# Patient Record
Sex: FEMALE | Race: WHITE | NOT HISPANIC OR LATINO | Employment: UNEMPLOYED | URBAN - METROPOLITAN AREA
[De-identification: names, ages, dates, MRNs, and addresses within clinical notes are randomized per-mention and may not be internally consistent; named-entity substitution may affect disease eponyms.]

---

## 2023-04-21 LAB
CHORIOGONADOTROPIN (MIU/ML) IN SER/PLAS: <2 MIU/ML
ERYTHROCYTE DISTRIBUTION WIDTH (RATIO) BY AUTOMATED COUNT: 12.7 % (ref 11.5–14.5)
ERYTHROCYTE MEAN CORPUSCULAR HEMOGLOBIN CONCENTRATION (G/DL) BY AUTOMATED: 32.4 G/DL (ref 32–36)
ERYTHROCYTE MEAN CORPUSCULAR VOLUME (FL) BY AUTOMATED COUNT: 93 FL (ref 80–100)
ERYTHROCYTES (10*6/UL) IN BLOOD BY AUTOMATED COUNT: 4.2 X10E12/L (ref 4–5.2)
FERRITIN (UG/LL) IN SER/PLAS: 24 UG/L (ref 8–150)
HEMATOCRIT (%) IN BLOOD BY AUTOMATED COUNT: 39.2 % (ref 36–46)
HEMOGLOBIN (G/DL) IN BLOOD: 12.7 G/DL (ref 12–16)
LEUKOCYTES (10*3/UL) IN BLOOD BY AUTOMATED COUNT: 4.7 X10E9/L (ref 4.4–11.3)
PLATELETS (10*3/UL) IN BLOOD AUTOMATED COUNT: 165 X10E9/L (ref 150–450)
THYROTROPIN (MIU/L) IN SER/PLAS BY DETECTION LIMIT <= 0.05 MIU/L: 1.58 MIU/L (ref 0.44–3.98)

## 2023-05-02 DIAGNOSIS — G43.909 MIGRAINE, UNSPECIFIED, NOT INTRACTABLE, WITHOUT STATUS MIGRAINOSUS: ICD-10-CM

## 2023-05-03 RX ORDER — RIMEGEPANT SULFATE 75 MG/75MG
TABLET, ORALLY DISINTEGRATING ORAL
Qty: 8 TABLET | Refills: 3 | Status: SHIPPED | OUTPATIENT
Start: 2023-05-03 | End: 2023-08-16

## 2023-08-11 DIAGNOSIS — G43.909 MIGRAINE, UNSPECIFIED, NOT INTRACTABLE, WITHOUT STATUS MIGRAINOSUS: ICD-10-CM

## 2023-08-16 RX ORDER — RIMEGEPANT SULFATE 75 MG/75MG
TABLET, ORALLY DISINTEGRATING ORAL
Qty: 8 TABLET | Refills: 3 | Status: SHIPPED | OUTPATIENT
Start: 2023-08-16 | End: 2023-11-27

## 2023-10-10 ENCOUNTER — APPOINTMENT (OUTPATIENT)
Dept: PRIMARY CARE | Facility: CLINIC | Age: 38
End: 2023-10-10
Payer: COMMERCIAL

## 2023-10-12 ENCOUNTER — OFFICE VISIT (OUTPATIENT)
Dept: PRIMARY CARE | Facility: CLINIC | Age: 38
End: 2023-10-12
Payer: COMMERCIAL

## 2023-10-12 VITALS
HEIGHT: 62 IN | DIASTOLIC BLOOD PRESSURE: 70 MMHG | HEART RATE: 75 BPM | SYSTOLIC BLOOD PRESSURE: 108 MMHG | BODY MASS INDEX: 21.27 KG/M2 | WEIGHT: 115.6 LBS

## 2023-10-12 DIAGNOSIS — M65.332 TRIGGER MIDDLE FINGER OF LEFT HAND: Primary | ICD-10-CM

## 2023-10-12 DIAGNOSIS — Z23 INFLUENZA VACCINE NEEDED: ICD-10-CM

## 2023-10-12 PROCEDURE — 1036F TOBACCO NON-USER: CPT | Performed by: STUDENT IN AN ORGANIZED HEALTH CARE EDUCATION/TRAINING PROGRAM

## 2023-10-12 PROCEDURE — 90686 IIV4 VACC NO PRSV 0.5 ML IM: CPT | Performed by: STUDENT IN AN ORGANIZED HEALTH CARE EDUCATION/TRAINING PROGRAM

## 2023-10-12 PROCEDURE — 90471 IMMUNIZATION ADMIN: CPT | Performed by: STUDENT IN AN ORGANIZED HEALTH CARE EDUCATION/TRAINING PROGRAM

## 2023-10-12 PROCEDURE — 99213 OFFICE O/P EST LOW 20 MIN: CPT | Performed by: STUDENT IN AN ORGANIZED HEALTH CARE EDUCATION/TRAINING PROGRAM

## 2023-10-12 RX ORDER — MINOCYCLINE HYDROCHLORIDE 100 MG/1
100 CAPSULE ORAL
COMMUNITY

## 2023-10-12 RX ORDER — CYPROHEPTADINE HYDROCHLORIDE 4 MG/1
4 TABLET ORAL NIGHTLY
COMMUNITY
Start: 2023-09-30

## 2023-10-12 NOTE — PROGRESS NOTES
"Subjective   Patient ID: Cassie Roberts is a 38 y.o. female who presents for Elbow Pain and Trigger Finger.    HPI   Patient has been noticing that she is experiencing what she believes is a trigger finger of her left middle finger    States that it has been catching and sometimes she is unable to fully open her hand    Notes some minor pain as well as some radiation to the middle of her elbow    Denies any trauma to the area and now wishes to discuss this further and wondering other treatment options/recommendations    Review of Systems  All pertinent positive symptoms are included in the history of present illness.    All other systems have been reviewed and are negative and noncontributory to this patient's current ailments.    Objective   /70 (BP Location: Right arm, Patient Position: Sitting, BP Cuff Size: Adult)   Pulse 75   Ht 1.575 m (5' 2\")   Wt 52.4 kg (115 lb 9.6 oz)   BMI 21.14 kg/m²     Physical Exam  CONSTITUTIONAL - well nourished, well developed, looks like stated age, in no acute distress, not ill-appearing, and not tired appearing  SKIN - normal skin color and pigmentation, normal skin turgor without rash, lesions, or nodules visualized  HEAD - no trauma, normocephalic  EYES - normal external exam  CHEST -no distressed breathing, good effort  EXTREMITIES - no edema, no deformities, noted slight catching of the finger upon examination with a small note on the hand  NEUROLOGICAL - normal balance, normal motor, no ataxia  PSYCHIATRIC - alert, pleasant and cordial, age-appropriate    Assessment/Plan   1.  Trigger finger  I would recommend that we provide a cortisone injection here in the office  I will notify you when my resident provider who provides these will be present and able to do so  This could be as soon as tomorrow or even Monday or Tuesday of next week    If you fail the injection, we will discuss getting you to a hand surgeon    2.  Flu vaccine need  All questions were answered " and you were counseled on immunization(s) in detail and vaccination was provided

## 2023-10-17 ENCOUNTER — OFFICE VISIT (OUTPATIENT)
Dept: PRIMARY CARE | Facility: CLINIC | Age: 38
End: 2023-10-17
Payer: COMMERCIAL

## 2023-10-17 VITALS
SYSTOLIC BLOOD PRESSURE: 104 MMHG | HEART RATE: 66 BPM | HEIGHT: 62 IN | DIASTOLIC BLOOD PRESSURE: 60 MMHG | BODY MASS INDEX: 21.16 KG/M2 | WEIGHT: 115 LBS

## 2023-10-17 DIAGNOSIS — M65.332 TRIGGER MIDDLE FINGER OF LEFT HAND: Primary | ICD-10-CM

## 2023-10-17 PROCEDURE — 20550 NJX 1 TENDON SHEATH/LIGAMENT: CPT | Performed by: STUDENT IN AN ORGANIZED HEALTH CARE EDUCATION/TRAINING PROGRAM

## 2023-10-17 PROCEDURE — 1036F TOBACCO NON-USER: CPT | Performed by: STUDENT IN AN ORGANIZED HEALTH CARE EDUCATION/TRAINING PROGRAM

## 2023-10-17 RX ORDER — TRIAMCINOLONE ACETONIDE 40 MG/ML
10 INJECTION, SUSPENSION INTRA-ARTICULAR; INTRAMUSCULAR ONCE
Status: COMPLETED | OUTPATIENT
Start: 2023-10-17 | End: 2023-10-17

## 2023-10-17 RX ADMIN — TRIAMCINOLONE ACETONIDE 10 MG: 40 INJECTION, SUSPENSION INTRA-ARTICULAR; INTRAMUSCULAR at 12:42

## 2023-10-17 NOTE — PROGRESS NOTES
"Subjective   Patient ID: Cassie Roberts is a 38 y.o. female who presents for Trigger Finger Coristone Injection.  Today she is accompanied by alone.     HPI  Trigger Finger  Patient has been noticing that she is experiencing what she believes is a trigger finger of her left middle finger  States that it has been catching and sometimes she is unable to fully open her hand   Notes some minor pain as well as some radiation to the middle of her elbow   Denies any trauma to the area and now wishes to discuss this further and wondering other treatment options/recommendations    Current Outpatient Medications on File Prior to Visit   Medication Sig Dispense Refill    cyproheptadine (Periactin) 4 mg tablet Take 1 tablet (4 mg) by mouth once daily at bedtime.      minocycline 100 mg capsule Take 1 capsule (100 mg) by mouth 2 times a day with meals.      Nurtec ODT 75 mg tablet,disintegrating TAKE 1 TABLET EVERY OTHER DAY AS NEEDED MIGRAINE 8 tablet 3     No current facility-administered medications on file prior to visit.        Allergies   Allergen Reactions    Cefaclor Rash       Immunization History   Administered Date(s) Administered    Flu vaccine (IIV4), preservative free *Check age/dose* 10/12/2023    Pfizer Purple Cap SARS-CoV-2 04/10/2021, 05/01/2021, 01/20/2022         Review of Systems  All pertinent positive symptoms are included in the history of present illness.  All other systems have been reviewed and are negative and noncontributory to this patient's current ailments.     Objective   /60 (BP Location: Left arm, Patient Position: Sitting, BP Cuff Size: Adult)   Pulse 66   Ht 1.575 m (5' 2\")   Wt 52.2 kg (115 lb)   BMI 21.03 kg/m²   BSA: 1.51 meters squared  No visits with results within 1 Month(s) from this visit.   Latest known visit with results is:   Legacy Encounter on 05/01/2023   Component Date Value Ref Range Status    Pathology Report 05/01/2023    Final                    Value:Name XAVIER, " KAREN CRISTIANGabby                                                                                                   Accession #: [id number]            Pathologist:                   Emerson Soni M.D., PhD.  Date of Procedure:    5/1/2023  Date Received:          5/2/2023  Date Reported           5/9/2023  Submitting Physician:   EAGLE CUTLER MD  Location:                    [facility]   Copy To/Referring/Attending:  EAGLE CUTLER MD Other External #                                                                    FINAL DIAGNOSIS  A.  ENDOMETRIUM, BIOPSY:  -- SECRETORY PATTERN ENDOMETRIUM.                                                                                                                                                                                                                                                                                                                                                                                                                                                                                                               Electronically Signed Out By Emerson Soni M.D., PhD./EAO  By the signature on this report, the individual or group listed as making the  Final Interpretation/Diagnosis certifies that they have reviewed this case.  Diagnostic interpretation performed at [facility] [address] [facility] [address]           Clinical History:  Fixative (A): FORMALIN  Clinical Diagnosis History: DUB (dysfunctional uterine bleeding) - (N93.8)    Specimens Submitted As:  A: ENDOMETRIAL BIOPSY     Gross Description:  Received in formalin, labeled with the patient's name and hospital number, are  multiple fragments of mucus, blood, and soft tissue aggregating to 2.8 x 1.0 x  0.5 cm.  The specimen is submitted in toto in one cassette.   DMB      dmb/5/3/2023               [facility]  Department of  Pathology   [address] [place] [address]           Physical Exam  CONSTITUTIONAL - well nourished, well developed, looks like stated age, in no acute distress, not ill-appearing, and not tired appearing  SKIN - normal skin color and pigmentation, normal skin turgor without rash, lesions, or nodules visualized  HEAD - no trauma, normocephalic  EYES - pupils are equal and reactive to light  NECK - supple without rigidity  CHEST - clear to auscultation, no wheezing, no crackles and no rales, good effort  CARDIAC - regular rate and regular rhythm, no skipped beats, no murmur  ABDOMEN - no organomegaly, soft, nontender, nondistended  EXTREMITIES - no edema, no deformities  NEUROLOGICAL - normal gait, normal balance, normal motor, no ataxia  PSYCHIATRIC - alert, pleasant and cordial, age-appropriate  IMMUNOLOGIC - no cervical lymphadenopathy     Patient ID: Cassie Roberts is a 38 y.o. female.    Injection tendon or ligament: L long A1 for trigger finger on 10/17/2023 12:22 PM  Indications: pain  Details: 27 G needle, volar approach  Outcome: tolerated well, no immediate complications    I explained the risks and benefits of a steroid injection into the patient's trigger finger tendon . These risks include but are not limited to local irritation from the injection or the numbing skin spray, irritation of the joint as a result of a reaction to the medications injected with possible flareup of the joint, and possible atrophy of the local subcutaneous fat, possibly skin discoloration at the site of the injection, and a risk of bleeding or infection, and a small risk of tendon rupture. The patient was instructed that if they did get flareup of the finger joint, they should ice the joint 15 minutes at a time 3 times a day for 3 days. It was explained to the patient that if the pain gets too significant or if they have any significant pain with range of motion that they did not experience pre-injection, that they  should go to a local Emergency Room right away. The patient decided to proceed with the injection.  The tendon nodule was identified and marked. The area was sterilely prepped utilizing Betadine swabs. 10 mg of Kenalog and 0.25 mL lidocaine for total volume of 0.5 mL drawn up into a 27-gauge needle. The needle was inserted perpendicularly into the nodule and the solution was injected into the nodule and tendon sheath. Patient tolerated the procedure well. No blood loss.  Consent was given by the patient.           Assessment/Plan   1.  Trigger finger  Cortisone injection given in the office  If you fail the injection, we will discuss getting you to a hand surgeon

## 2023-10-31 ENCOUNTER — APPOINTMENT (OUTPATIENT)
Dept: PRIMARY CARE | Facility: CLINIC | Age: 38
End: 2023-10-31
Payer: COMMERCIAL

## 2023-11-17 DIAGNOSIS — G43.909 MIGRAINE, UNSPECIFIED, NOT INTRACTABLE, WITHOUT STATUS MIGRAINOSUS: ICD-10-CM

## 2023-11-27 RX ORDER — RIMEGEPANT SULFATE 75 MG/75MG
TABLET, ORALLY DISINTEGRATING ORAL
Qty: 8 TABLET | Refills: 0 | Status: SHIPPED | OUTPATIENT
Start: 2023-11-27 | End: 2023-12-15 | Stop reason: SDUPTHER

## 2023-12-15 ENCOUNTER — OFFICE VISIT (OUTPATIENT)
Dept: PRIMARY CARE | Facility: CLINIC | Age: 38
End: 2023-12-15
Payer: COMMERCIAL

## 2023-12-15 VITALS
HEART RATE: 94 BPM | DIASTOLIC BLOOD PRESSURE: 80 MMHG | WEIGHT: 114.2 LBS | SYSTOLIC BLOOD PRESSURE: 126 MMHG | BODY MASS INDEX: 20.89 KG/M2

## 2023-12-15 DIAGNOSIS — G90.A POTS (POSTURAL ORTHOSTATIC TACHYCARDIA SYNDROME): ICD-10-CM

## 2023-12-15 DIAGNOSIS — G43.909 MIGRAINE, UNSPECIFIED, NOT INTRACTABLE, WITHOUT STATUS MIGRAINOSUS: ICD-10-CM

## 2023-12-15 DIAGNOSIS — M65.332 TRIGGER MIDDLE FINGER OF LEFT HAND: ICD-10-CM

## 2023-12-15 DIAGNOSIS — G43.809 OTHER MIGRAINE WITHOUT STATUS MIGRAINOSUS, NOT INTRACTABLE: ICD-10-CM

## 2023-12-15 DIAGNOSIS — Z00.00 HEALTHCARE MAINTENANCE: Primary | ICD-10-CM

## 2023-12-15 PROCEDURE — 93000 ELECTROCARDIOGRAM COMPLETE: CPT | Performed by: STUDENT IN AN ORGANIZED HEALTH CARE EDUCATION/TRAINING PROGRAM

## 2023-12-15 PROCEDURE — 99213 OFFICE O/P EST LOW 20 MIN: CPT | Performed by: STUDENT IN AN ORGANIZED HEALTH CARE EDUCATION/TRAINING PROGRAM

## 2023-12-15 PROCEDURE — 99395 PREV VISIT EST AGE 18-39: CPT | Performed by: STUDENT IN AN ORGANIZED HEALTH CARE EDUCATION/TRAINING PROGRAM

## 2023-12-15 PROCEDURE — 1036F TOBACCO NON-USER: CPT | Performed by: STUDENT IN AN ORGANIZED HEALTH CARE EDUCATION/TRAINING PROGRAM

## 2023-12-15 ASSESSMENT — PATIENT HEALTH QUESTIONNAIRE - PHQ9
2. FEELING DOWN, DEPRESSED OR HOPELESS: NOT AT ALL
1. LITTLE INTEREST OR PLEASURE IN DOING THINGS: NOT AT ALL
SUM OF ALL RESPONSES TO PHQ9 QUESTIONS 1 AND 2: 0

## 2023-12-15 NOTE — PROGRESS NOTES
"Subjective   Patient ID: Harper Roberts \"Jose Carlos" is a 38 y.o. female who presents for Annual Exam.  Today she is accompanied by alone.     HPI  Health Maintenance  Overall patient is doing well.   Immunization: Tdap 2022  Influenza vaccine UTD  COVID-19 vaccine (Pfizer Moderna) UTD  Colon Cancer Screening: No family history  OB/GYN: Sees Dr. Bernal; Pap smear up to date.  Stated that she has been having some abnormal uterine bleeding  Biopsy was actually performed as there was thickened endometrial tissue  Now wishes to discuss possibly the option of having an IUD placed  Diet: Working on her diet  Exercise: Attempting to exercise more frequently  Tobacco: Denies use  EtOH: Rarely/socially     2. Trigger finger  Patient has been noticing that she is experiencing what she believes is a trigger finger of her left middle finger   Steroid injection by Dr. Enio Peralta 10/17  Doing much better without any issues/complaints    3. Migraines  Continues using Nurtec as indicated  Requesting refills to be sent to her pharmacy    4.  POTS  Continues to follow-up with neurology with her last appointment being in January 2023  Currently asymptomatic and feels well     Current Outpatient Medications on File Prior to Visit   Medication Sig Dispense Refill    cyproheptadine (Periactin) 4 mg tablet Take 1 tablet (4 mg) by mouth once daily at bedtime.      minocycline 100 mg capsule Take 1 capsule (100 mg) by mouth 2 times a day with meals.      [DISCONTINUED] Nurtec ODT 75 mg tablet,disintegrating TAKE 1 TABLET EVERY OTHER DAY AS NEEDED MIGRAINE 8 tablet 0     No current facility-administered medications on file prior to visit.        Allergies   Allergen Reactions    Cefaclor Rash       Immunization History   Administered Date(s) Administered    Flu vaccine (IIV4), preservative free *Check age/dose* 11/07/2022, 10/12/2023    Hepatitis B vaccine, adult (RECOMBIVAX, ENGERIX) 01/25/2013, 02/22/2013, 08/02/2013    Influenza, Unspecified " 10/01/2014    Pfizer Purple Cap SARS-CoV-2 04/10/2021, 05/01/2021, 01/20/2022    Tdap vaccine, age 7 year and older (BOOSTRIX) 06/12/2015, 07/11/2022       Review of Systems  All pertinent positive symptoms are included in the history of present illness.  All other systems have been reviewed and are negative and noncontributory to this patient's current ailments.     Objective   /80 (BP Location: Left arm, Patient Position: Sitting, BP Cuff Size: Adult)   Pulse 94   Wt 51.8 kg (114 lb 3.2 oz)   BMI 20.89 kg/m²   BSA: 1.51 meters squared  No visits with results within 1 Month(s) from this visit.   Latest known visit with results is:   Legacy Encounter on 05/01/2023   Component Date Value Ref Range Status    Pathology Report 05/01/2023    Final                    Value:Name KAREN PARK                                                                                                   Accession #: [id number]            Pathologist:                   Emerson Soni M.D., PhD.  Date of Procedure:    5/1/2023  Date Received:          5/2/2023  Date Reported           5/9/2023  Submitting Physician:   EAGLE CUTLER MD  Location:                    [facility]   Copy To/Referring/Attending:  EAGLE CUTLER MD Other External #                                                                    FINAL DIAGNOSIS  A.  ENDOMETRIUM, BIOPSY:  -- SECRETORY PATTERN ENDOMETRIUM.                                                                                                                                                                                                                                                                                                                                                                                                                                                                                                               Electronically Signed Out By Emerson Soni  M.D., PhD./EAO  By the signature on this report, the individual or group listed as making the  Final Interpretation/Diagnosis certifies that they have reviewed this case.  Diagnostic interpretation performed at [facility] [id number] [place] [address]. [place] [address]           Clinical History:  Fixative (A): FORMALIN  Clinical Diagnosis History: DUB (dysfunctional uterine bleeding) - (N93.8)    Specimens Submitted As:  A: ENDOMETRIAL BIOPSY     Gross Description:  Received in formalin, labeled with the patient's name and hospital number, are  multiple fragments of mucus, blood, and soft tissue aggregating to 2.8 x 1.0 x  0.5 cm.  The specimen is submitted in toto in one cassette.   DMB      dmb/5/3/2023               [facility]  Department of Pathology   [address] [place] [address]           Physical Exam  CONSTITUTIONAL - well nourished, well developed, looks like stated age, in no acute distress, not ill-appearing, and not tired appearing  SKIN - normal skin color and pigmentation, normal skin turgor without rash, lesions, or nodules visualized  HEAD - no trauma, normocephalic  EYES - pupils are equal and reactive to light, extraocular muscles are intact, and normal external exam  ENT - TM's intact, no injection, no signs of infection, uvula midline, normal tongue movement and throat normal, no exudate  NECK - supple without rigidity, no neck mass was observed, no thyromegaly or thyroid nodules  CHEST - clear to auscultation, no wheezing, no crackles and no rales, good effort  CARDIAC - regular rate and regular rhythm, no skipped beats, no murmur  ABDOMEN - no organomegaly, soft, nontender, nondistended, no guarding/rebound/rigidity  EXTREMITIES - no edema, no deformities  NEUROLOGICAL - alert, oriented and no focal signs  PSYCHIATRIC - alert, pleasant and cordial, age-appropriate  IMMUNOLOGIC - no cervical lymphadenopathy      Assessment/Plan   1. Health  maintenance  Complete history and physical examination was performed  EKG reveals sinus rhythm without any acute changes  Requisition for CBC, CMP, A1c, lipid, TSH, vitamin D has been provided to you  We will notify of test results once available and make treatment recommendations accordingly  I do recommend you follow-up with your OB/GYN due to your signs/symptoms of endometrial bleeding as well as all your other signs/symptoms  An IUD may be a fantastic option even if this can help with your migraines  Please make an appointment at your earliest major convenience    2. Trigger finger  Continue monitoring signs/symptoms  I understand that you are continue to have elbow pain that could even be due to a tennis elbow picture  Please purchase an over-the-counter brace to see if this will help  Monitor symptoms and if they worsen please notify the office and we will discuss this further    3. Migraines  Continue using Nurtec as currently prescribed  Refill sent to your pharmacy    4.  POTS  Please continue following up with neurology per their protocol  Continue monitoring signs/symptoms    Please follow-up on an as-needed basis and/or yearly for your physicals

## 2024-02-05 ENCOUNTER — TELEMEDICINE (OUTPATIENT)
Dept: PRIMARY CARE | Facility: CLINIC | Age: 39
End: 2024-02-05
Payer: COMMERCIAL

## 2024-02-05 DIAGNOSIS — R09.81 SINUS CONGESTION: ICD-10-CM

## 2024-02-05 DIAGNOSIS — R42 DIZZINESS: ICD-10-CM

## 2024-02-05 DIAGNOSIS — J06.9 BACTERIAL URI: Primary | ICD-10-CM

## 2024-02-05 DIAGNOSIS — B96.89 BACTERIAL URI: Primary | ICD-10-CM

## 2024-02-05 DIAGNOSIS — J02.9 SORE THROAT: ICD-10-CM

## 2024-02-05 PROCEDURE — 99214 OFFICE O/P EST MOD 30 MIN: CPT | Performed by: STUDENT IN AN ORGANIZED HEALTH CARE EDUCATION/TRAINING PROGRAM

## 2024-02-05 RX ORDER — AMOXICILLIN AND CLAVULANATE POTASSIUM 875; 125 MG/1; MG/1
875 TABLET, FILM COATED ORAL 2 TIMES DAILY
Qty: 20 TABLET | Refills: 0 | Status: SHIPPED | OUTPATIENT
Start: 2024-02-05 | End: 2024-02-15

## 2024-02-05 RX ORDER — FLUTICASONE PROPIONATE 50 MCG
1 SPRAY, SUSPENSION (ML) NASAL 2 TIMES DAILY
Qty: 16 G | Refills: 1 | Status: SHIPPED | OUTPATIENT
Start: 2024-02-05

## 2024-02-05 RX ORDER — AZELASTINE 1 MG/ML
1 SPRAY, METERED NASAL 2 TIMES DAILY
Qty: 30 ML | Refills: 2 | Status: SHIPPED | OUTPATIENT
Start: 2024-02-05

## 2024-02-05 RX ORDER — METHYLPREDNISOLONE 4 MG/1
TABLET ORAL
Qty: 21 TABLET | Refills: 0 | Status: SHIPPED | OUTPATIENT
Start: 2024-02-05

## 2024-02-05 NOTE — PROGRESS NOTES
"Subjective   Patient ID: Harper Roberts \"Jose Carlos" is a 38 y.o. female who presents virtually for an upper respiratory infection  Today she is accompanied by alone.     HPI  Patient has had a productive cough, sore throat, and dizziness for about one week now.  Her  tested positive for COVID-19 one week ago; patient has tested negative twice so far.  Has history of sinus infections and feels that her symptoms may be evolving into a sinus infection due to new increased pressure, postnasal drainage, headaches, facial pain, and nasal dryness with nosebleeds.  Mucous is clear and not thick.  Also reports that 3 weeks ago, she began to experience worsening dizziness and new tinnitus in bilateral ears; went to urgent care because she was concerned about possible infection.  She is concerned that steroids for current illness could make POTS symptoms worse.    Current Outpatient Medications on File Prior to Visit   Medication Sig Dispense Refill    cyproheptadine (Periactin) 4 mg tablet Take 1 tablet (4 mg) by mouth once daily at bedtime.      minocycline 100 mg capsule Take 1 capsule (100 mg) by mouth 2 times a day with meals.      rimegepant (Nurtec ODT) 75 mg tablet,disintegrating Take 1 tablet (75 mg) by mouth if needed (Migraines). 16 tablet 3     No current facility-administered medications on file prior to visit.        Allergies   Allergen Reactions    Cefaclor Rash       Immunization History   Administered Date(s) Administered    Flu vaccine (IIV4), preservative free *Check age/dose* 11/07/2022, 10/12/2023    Hepatitis B vaccine, adult (RECOMBIVAX, ENGERIX) 01/25/2013, 02/22/2013, 08/02/2013    Influenza, Unspecified 10/01/2014    Pfizer Purple Cap SARS-CoV-2 04/10/2021, 05/01/2021, 01/20/2022    Tdap vaccine, age 7 year and older (BOOSTRIX, ADACEL) 06/12/2015, 07/11/2022         Review of Systems  All pertinent positive symptoms are included in the history of present illness.  All other systems have been " reviewed and are negative and noncontributory to this patient's current ailments.     Objective   There were no vitals taken for this visit.  BSA: There is no height or weight on file to calculate BSA.  No visits with results within 1 Month(s) from this visit.   Latest known visit with results is:   Legacy Encounter on 05/01/2023   Component Date Value Ref Range Status    Pathology Report 05/01/2023    Final                    Value:Name KAREN PARK                                                                                                   Accession #: [id number]            Pathologist:                   Emerson Soni M.D., PhD.  Date of Procedure:    5/1/2023  Date Received:          5/2/2023  Date Reported           5/9/2023  Submitting Physician:   EAGLE CUTLER MD  Location:                    [facility]   Copy To/Referring/Attending:  EAGLE CUTLER MD Other External #                                                                    FINAL DIAGNOSIS  A.  ENDOMETRIUM, BIOPSY:  -- SECRETORY PATTERN ENDOMETRIUM.                                                                                                                                                                                                                                                                                                                                                                                                                                                                                                               Electronically Signed Out By Emerson Soni M.D., PhD./EAO  By the signature on this report, the individual or group listed as making the  Final Interpretation/Diagnosis certifies that they have reviewed this case.  Diagnostic interpretation performed at [facility] [address] [facility] [address]           Clinical History:  Fixative (A): FORMALIN  Clinical Diagnosis History:  DUB (dysfunctional uterine bleeding) - (N93.8)    Specimens Submitted As:  A: ENDOMETRIAL BIOPSY     Gross Description:  Received in formalin, labeled with the patient's name and hospital number, are  multiple fragments of mucus, blood, and soft tissue aggregating to 2.8 x 1.0 x  0.5 cm.  The specimen is submitted in toto in one cassette.   ROXANN taylor/5/3/2023               [facility]  Department of Pathology   [address] [place] [address]           Physical Exam  Exam somewhat limited by virtual appointment.   CONSTITUTIONAL - well nourished, well developed, looks like stated age, in no acute distress, not ill-appearing, and not tired appearing  SKIN - normal skin color and pigmentation, normal skin turgor without rash, lesions, or nodules visualized  HEAD - no trauma, normocephalic  EYES - normal external exam  CHEST - no distressed breathing, good effort  NEUROLOGICAL - alert and oriented x3  PSYCHIATRIC - alert, pleasant and cordial, age-appropriate    Assessment/Plan   Bacterial Sinusitis  We discussed the likelihood of you having COVID-19, given your 's recent diagnosis, and the possibility of developing bacterial sinusitis on top of COVID-19.  I have sent Augmentin to your preferred pharmacy.  We discussed that steroids could actually help with POTS, rather than worsen symptoms.  Lastly, I did send over nasal sprays to use as prescribed  I have sent a prescription for prednisone over to help with sinusitis, however please try to refrain from using this for a few days unless your symptoms are worsening.  Risks, benefits, and options of treatment(s) were discussed after reviewing all current medication(s) and drug allergy(ies)  I opted for the treatment that we discussed with instructions on the medication use for your underlying medical ailment(s)  I encouraged supportive care such as rest, fluids and Advil/Tylenol as warranted  Return to the clinic in  7-10 days or sooner if symptoms worsen or persist as we will then further evaluate  At any point you have worsening or acute signs/symptoms such as chest pain or any difficulty breathing you notify me immediately and/or go to the nearest emergency room to be acutely evaluated

## 2024-03-08 ENCOUNTER — LAB (OUTPATIENT)
Dept: LAB | Facility: LAB | Age: 39
End: 2024-03-08
Payer: COMMERCIAL

## 2024-03-08 DIAGNOSIS — Z00.00 HEALTHCARE MAINTENANCE: ICD-10-CM

## 2024-03-08 DIAGNOSIS — N92.6 IRREGULAR MENSTRUAL BLEEDING: ICD-10-CM

## 2024-03-08 LAB
ALBUMIN SERPL BCP-MCNC: 4.4 G/DL (ref 3.4–5)
ALP SERPL-CCNC: 35 U/L (ref 33–110)
ALT SERPL W P-5'-P-CCNC: 10 U/L (ref 7–45)
ANION GAP SERPL CALC-SCNC: 10 MMOL/L (ref 10–20)
AST SERPL W P-5'-P-CCNC: 14 U/L (ref 9–39)
BASOPHILS # BLD AUTO: 0.03 X10*3/UL (ref 0–0.1)
BASOPHILS NFR BLD AUTO: 0.8 %
BILIRUB SERPL-MCNC: 0.4 MG/DL (ref 0–1.2)
BUN SERPL-MCNC: 18 MG/DL (ref 6–23)
CALCIUM SERPL-MCNC: 8.8 MG/DL (ref 8.6–10.3)
CHLORIDE SERPL-SCNC: 105 MMOL/L (ref 98–107)
CHOLEST SERPL-MCNC: 151 MG/DL (ref 0–199)
CHOLESTEROL/HDL RATIO: 2
CO2 SERPL-SCNC: 27 MMOL/L (ref 21–32)
CREAT SERPL-MCNC: 0.78 MG/DL (ref 0.5–1.05)
EGFRCR SERPLBLD CKD-EPI 2021: >90 ML/MIN/1.73M*2
EOSINOPHIL # BLD AUTO: 0.09 X10*3/UL (ref 0–0.7)
EOSINOPHIL NFR BLD AUTO: 2.3 %
ERYTHROCYTE [DISTWIDTH] IN BLOOD BY AUTOMATED COUNT: 13.2 % (ref 11.5–14.5)
GLUCOSE SERPL-MCNC: 78 MG/DL (ref 74–99)
HCT VFR BLD AUTO: 40.8 % (ref 36–46)
HDLC SERPL-MCNC: 75.2 MG/DL
HGB BLD-MCNC: 12.9 G/DL (ref 12–16)
IMM GRANULOCYTES # BLD AUTO: 0 X10*3/UL (ref 0–0.7)
IMM GRANULOCYTES NFR BLD AUTO: 0 % (ref 0–0.9)
LDLC SERPL CALC-MCNC: 65 MG/DL
LYMPHOCYTES # BLD AUTO: 1.58 X10*3/UL (ref 1.2–4.8)
LYMPHOCYTES NFR BLD AUTO: 39.7 %
MCH RBC QN AUTO: 30.4 PG (ref 26–34)
MCHC RBC AUTO-ENTMCNC: 31.6 G/DL (ref 32–36)
MCV RBC AUTO: 96 FL (ref 80–100)
MONOCYTES # BLD AUTO: 0.4 X10*3/UL (ref 0.1–1)
MONOCYTES NFR BLD AUTO: 10.1 %
NEUTROPHILS # BLD AUTO: 1.88 X10*3/UL (ref 1.2–7.7)
NEUTROPHILS NFR BLD AUTO: 47.1 %
NON HDL CHOLESTEROL: 76 MG/DL (ref 0–149)
NRBC BLD-RTO: 0 /100 WBCS (ref 0–0)
PLATELET # BLD AUTO: 181 X10*3/UL (ref 150–450)
POTASSIUM SERPL-SCNC: 4 MMOL/L (ref 3.5–5.3)
PROT SERPL-MCNC: 6.8 G/DL (ref 6.4–8.2)
RBC # BLD AUTO: 4.24 X10*6/UL (ref 4–5.2)
SODIUM SERPL-SCNC: 138 MMOL/L (ref 136–145)
TRIGL SERPL-MCNC: 55 MG/DL (ref 0–149)
TSH SERPL-ACNC: 2.21 MIU/L (ref 0.44–3.98)
VLDL: 11 MG/DL (ref 0–40)
WBC # BLD AUTO: 4 X10*3/UL (ref 4.4–11.3)

## 2024-03-08 PROCEDURE — 80053 COMPREHEN METABOLIC PANEL: CPT

## 2024-03-08 PROCEDURE — 83036 HEMOGLOBIN GLYCOSYLATED A1C: CPT

## 2024-03-08 PROCEDURE — 84443 ASSAY THYROID STIM HORMONE: CPT

## 2024-03-08 PROCEDURE — 82728 ASSAY OF FERRITIN: CPT

## 2024-03-08 PROCEDURE — 36415 COLL VENOUS BLD VENIPUNCTURE: CPT

## 2024-03-08 PROCEDURE — 80061 LIPID PANEL: CPT

## 2024-03-08 PROCEDURE — 85025 COMPLETE CBC W/AUTO DIFF WBC: CPT

## 2024-03-09 LAB
EST. AVERAGE GLUCOSE BLD GHB EST-MCNC: 105 MG/DL
HBA1C MFR BLD: 5.3 %

## 2024-03-10 NOTE — RESULT ENCOUNTER NOTE
Complete blood cell count does show a slightly lower white blood cell count of 4.0 but this has been stable for quite some time and no anemia noted    Hemoglobin A1c well within normal limits at 5.3%    Cholesterol looks good at 151, HDL 75, LDL 65, triglycerides 55    Sugar, kidneys, liver, electrolytes are all within normal limits    Thyroid within normal limits

## 2024-03-11 ENCOUNTER — OFFICE VISIT (OUTPATIENT)
Dept: OBSTETRICS AND GYNECOLOGY | Facility: CLINIC | Age: 39
End: 2024-03-11
Payer: COMMERCIAL

## 2024-03-11 VITALS — BODY MASS INDEX: 21.58 KG/M2 | SYSTOLIC BLOOD PRESSURE: 110 MMHG | WEIGHT: 118 LBS | DIASTOLIC BLOOD PRESSURE: 72 MMHG

## 2024-03-11 DIAGNOSIS — N92.6 IRREGULAR MENSTRUAL BLEEDING: ICD-10-CM

## 2024-03-11 LAB — FERRITIN SERPL-MCNC: 21 NG/ML (ref 8–150)

## 2024-03-11 PROCEDURE — 99214 OFFICE O/P EST MOD 30 MIN: CPT | Performed by: OBSTETRICS & GYNECOLOGY

## 2024-03-11 PROCEDURE — 1036F TOBACCO NON-USER: CPT | Performed by: OBSTETRICS & GYNECOLOGY

## 2024-03-11 NOTE — PROGRESS NOTES
Chief Complaint   Patient presents with    Vaginal Bleeding     Irregular Menstrual Cycles    [id number]    C/O irregular menstrual bleeding.  More pain in left ovary during cycles, sometimes having clots larger than a quarter in size.   Oct 17- 11 days on 12 days off, - 8 days on 12 days off,  8 days on 18 days off, Dec 25-  7 days on 17 days off, - 8 days on 16 days off,  7 days on 15 days off, Mar 4- current; also notes light spotting in between cycles on occasion.  Discuss possible IUD.    Chaperone declined.     Since September, patient has had frequent prolonged bleeding.  Some months bleeds as often as she does not and other months bleeds for 7 to 8 days and cycles are 20 days apart.  Patient wants to know everything is okay and she would like to know what her options are to improve her bleeding pattern.  Of note, 1 year ago had an ultrasound as well as an endometrial biopsy showing secretory endometrium.  Recent TSH and CBC are normal.  We did discuss potential perimenopausal reason for bleeding, though only 38.    Discussed all treatment options for bleeding and patient is interested in a progestin IUD.  Will repeat ultrasound to make sure no endometrial polyp.    REVIEW OF SYSTEMS    Please see HPI for reported pertinent positives, which would supersede this ROS    Constitutional:  Denies fever, chills, wt gain or loss, fatigue    Genito-Urinary:  Denies genital lesion or sores, vaginal dryness, itching  or pain.  No abnormal vaginal discharge or unexplained vaginal bleeding.  No dysuria, urinary incontinence or frequency.  Denies pelvic pain, dysmenorrhea or dyspareunia.    Eyes:  Denies vision changes, dryness  ENT:  No hearing loss, sinus pain or congestion, nosebleeds  Cardiovascular:  No chest pain or palpitations  Respiratory:  No SOB, cough, wheezing  GI:  No Nausea, vomiting, diarrhea, constipation, abdominal pain  Musculoskeletal:  No new back pain. joint pain,  peripheral edema  Skin:  No rash or skin lesion  Neurologic:  No HA, numbness or dizziness  Psychiatric:  No new anxiety or depression  Endocrine:  No loss of hair or hirsutism  Hematologic/lymphatic:  No swollen lymph nodes.  No reported tendency for easy bruising or bleeding    Patient admits to no other systemic complaints      PHYSICAL EXAM      PSYCH:  Pt is alert, oriented and cooperative    SKIN: warm, dry, w/o lesion    HEAD AND FACE:  External inspection of eyes, ears, functional cranial nerves normal and intact    THYROID:  No thyromegaly    CARDIOVASCULAR:  Warm and well Perfused    PULMONARY:  No respiratory distress    ABDOMEN:  soft, nontender.  No mass or organomegaly.      PELVIC:    External genitalia, urethra, perianal region normal to inspection and palpation if indicated.  No inguinal LA    Vagina without lesions.    Cervix seen and visually normal      Bimanual exam:      No pelvic mass palpable    Uterus nontender, midline in pelvis    No adnexal masses or tenderness    Assessment/Plan    Diagnoses and all orders for this visit:  Irregular menstrual bleeding - hoping for progestin iud if usn nl  -     Ferritin; Future  -     US pelvis; Future

## 2024-03-12 DIAGNOSIS — R79.89 ABNORMAL COMPLETE BLOOD COUNT: ICD-10-CM

## 2024-03-14 ENCOUNTER — HOSPITAL ENCOUNTER (OUTPATIENT)
Dept: RADIOLOGY | Facility: CLINIC | Age: 39
Discharge: HOME | End: 2024-03-14
Payer: COMMERCIAL

## 2024-03-14 ENCOUNTER — LAB (OUTPATIENT)
Dept: LAB | Facility: LAB | Age: 39
End: 2024-03-14
Payer: COMMERCIAL

## 2024-03-14 DIAGNOSIS — R79.89 ABNORMAL COMPLETE BLOOD COUNT: ICD-10-CM

## 2024-03-14 DIAGNOSIS — N92.6 IRREGULAR MENSTRUAL BLEEDING: ICD-10-CM

## 2024-03-14 LAB
BASOPHILS # BLD AUTO: 0.04 X10*3/UL (ref 0–0.1)
BASOPHILS NFR BLD AUTO: 1 %
EOSINOPHIL # BLD AUTO: 0.04 X10*3/UL (ref 0–0.7)
EOSINOPHIL NFR BLD AUTO: 1 %
ERYTHROCYTE [DISTWIDTH] IN BLOOD BY AUTOMATED COUNT: 12.8 % (ref 11.5–14.5)
HCT VFR BLD AUTO: 40.4 % (ref 36–46)
HGB BLD-MCNC: 13 G/DL (ref 12–16)
IMM GRANULOCYTES # BLD AUTO: 0.01 X10*3/UL (ref 0–0.7)
IMM GRANULOCYTES NFR BLD AUTO: 0.2 % (ref 0–0.9)
LYMPHOCYTES # BLD AUTO: 1.46 X10*3/UL (ref 1.2–4.8)
LYMPHOCYTES NFR BLD AUTO: 35.8 %
MCH RBC QN AUTO: 30.7 PG (ref 26–34)
MCHC RBC AUTO-ENTMCNC: 32.2 G/DL (ref 32–36)
MCV RBC AUTO: 95 FL (ref 80–100)
MONOCYTES # BLD AUTO: 0.45 X10*3/UL (ref 0.1–1)
MONOCYTES NFR BLD AUTO: 11 %
NEUTROPHILS # BLD AUTO: 2.08 X10*3/UL (ref 1.2–7.7)
NEUTROPHILS NFR BLD AUTO: 51 %
NRBC BLD-RTO: 0 /100 WBCS (ref 0–0)
PLATELET # BLD AUTO: 187 X10*3/UL (ref 150–450)
RBC # BLD AUTO: 4.24 X10*6/UL (ref 4–5.2)
WBC # BLD AUTO: 4.1 X10*3/UL (ref 4.4–11.3)

## 2024-03-14 PROCEDURE — 85025 COMPLETE CBC W/AUTO DIFF WBC: CPT

## 2024-03-14 PROCEDURE — 76830 TRANSVAGINAL US NON-OB: CPT | Performed by: OBSTETRICS & GYNECOLOGY

## 2024-03-14 PROCEDURE — 36415 COLL VENOUS BLD VENIPUNCTURE: CPT

## 2024-03-15 NOTE — RESULT ENCOUNTER NOTE
Complete blood cell count continues to show a slightly low white blood cell count but did improve up to 4.1 with previous at 4.0    Otherwise differential completely normal so I am not concerned    Will continue monitoring every 6-12 months

## 2024-04-01 DIAGNOSIS — D72.9 ABNORMAL WHITE BLOOD CELL COUNT: ICD-10-CM

## 2024-04-03 PROBLEM — N93.9 ABNORMAL UTERINE BLEEDING: Status: RESOLVED | Noted: 2024-04-03 | Resolved: 2024-04-03

## 2024-04-03 PROBLEM — I10 HYPERTENSION: Status: ACTIVE | Noted: 2024-04-03

## 2024-04-03 PROBLEM — D72.819 LEUKOPENIA: Status: ACTIVE | Noted: 2024-04-03

## 2024-04-03 PROBLEM — B37.31 CANDIDIASIS OF VAGINA: Status: ACTIVE | Noted: 2024-04-03

## 2024-04-03 PROBLEM — O13.3 GESTATIONAL HYPERTENSION, THIRD TRIMESTER (HHS-HCC): Status: RESOLVED | Noted: 2024-04-03 | Resolved: 2024-04-03

## 2024-04-03 PROBLEM — A69.20 LYME DISEASE: Status: ACTIVE | Noted: 2024-04-03

## 2024-04-03 PROBLEM — M79.673 PAIN OF FOOT: Status: RESOLVED | Noted: 2024-04-03 | Resolved: 2024-04-03

## 2024-04-03 PROBLEM — N64.4 BREAST PAIN: Status: RESOLVED | Noted: 2024-04-03 | Resolved: 2024-04-03

## 2024-04-03 PROBLEM — H04.123 DRY EYES, BILATERAL: Chronic | Status: RESOLVED | Noted: 2017-07-18 | Resolved: 2024-04-03

## 2024-04-03 PROBLEM — N99.820 POSTOPERATIVE VAGINAL BLEEDING FOLLOWING GENITOURINARY PROCEDURE: Status: RESOLVED | Noted: 2024-04-03 | Resolved: 2024-04-03

## 2024-04-03 PROBLEM — M65.332 ACQUIRED TRIGGER FINGER OF LEFT MIDDLE FINGER: Status: RESOLVED | Noted: 2024-04-03 | Resolved: 2024-04-03

## 2024-04-03 PROBLEM — Z20.822 CONTACT WITH AND (SUSPECTED) EXPOSURE TO COVID-19: Status: RESOLVED | Noted: 2022-08-29 | Resolved: 2024-04-03

## 2024-04-03 PROBLEM — D62 ACUTE POSTHEMORRHAGIC ANEMIA: Status: RESOLVED | Noted: 2022-08-29 | Resolved: 2024-04-03

## 2024-04-03 PROBLEM — D24.9 FIBROADENOMA OF BREAST: Status: ACTIVE | Noted: 2024-04-03

## 2024-04-03 PROBLEM — R42 DIZZINESS: Status: RESOLVED | Noted: 2024-04-03 | Resolved: 2024-04-03

## 2024-04-03 PROBLEM — R09.81 CONGESTION OF PARANASAL SINUS: Status: RESOLVED | Noted: 2024-04-03 | Resolved: 2024-04-03

## 2024-04-03 PROBLEM — Z3A.38 38 WEEKS GESTATION OF PREGNANCY (HHS-HCC): Status: RESOLVED | Noted: 2024-04-03 | Resolved: 2024-04-03

## 2024-04-03 PROBLEM — K58.9 IRRITABLE BOWEL SYNDROME WITHOUT DIARRHEA: Status: RESOLVED | Noted: 2022-09-01 | Resolved: 2024-04-03

## 2024-04-12 ENCOUNTER — PROCEDURE VISIT (OUTPATIENT)
Dept: OBSTETRICS AND GYNECOLOGY | Facility: CLINIC | Age: 39
End: 2024-04-12
Payer: COMMERCIAL

## 2024-04-12 VITALS — DIASTOLIC BLOOD PRESSURE: 80 MMHG | WEIGHT: 116 LBS | BODY MASS INDEX: 21.22 KG/M2 | SYSTOLIC BLOOD PRESSURE: 116 MMHG

## 2024-04-12 DIAGNOSIS — Z30.430 ENCOUNTER FOR IUD INSERTION: Primary | ICD-10-CM

## 2024-04-12 LAB — PREGNANCY TEST URINE, POC: NEGATIVE

## 2024-04-12 PROCEDURE — 81025 URINE PREGNANCY TEST: CPT | Performed by: OBSTETRICS & GYNECOLOGY

## 2024-04-12 PROCEDURE — 87800 DETECT AGNT MULT DNA DIREC: CPT

## 2024-04-12 PROCEDURE — 58300 INSERT INTRAUTERINE DEVICE: CPT | Performed by: OBSTETRICS & GYNECOLOGY

## 2024-04-12 RX ORDER — LEVONORGESTREL 52 MG/1
INTRAUTERINE DEVICE INTRAUTERINE ONCE
COMMUNITY

## 2024-04-12 NOTE — PROGRESS NOTES
"Chief Complaint   Patient presents with    IUD Insertion     Liletta IUD Insertion    [id number]    Liletta IUD   Lot# [id number]  Exp: 3/2026  NDC: [id number]    Would like to confirm that the progesterone is isolated in the Liletta, as she has not done well with Progesterone in the past.    Chaperone declined.     Patient ID: Harper Roberts \"Jose Carlos" is a 38 y.o. female.    IUD Insertion    Date/Time: 2024 10:49 AM    Performed by: Mark Bernal MD  Authorized by: Mark Bernal MD    Consent:     Consent obtained:  Verbal    Consent given by:  Patient    Procedure risks and benefits discussed: yes      Patient questions answered: yes      Patient agrees, verbalizes understanding, and wants to proceed: yes    Procedure:     Negative GC/chlamydia test: obtained.      Negative urine pregnancy test: yes      Cervix cleaned and prepped: yes      Speculum placed in vagina: yes      Tenaculum applied to cervix: yes      Uterus sound depth (cm):  8    IUD inserted with no complications: yes      IUD type: liletta.  Post-procedure:     Patient tolerated procedure well: yes      "

## 2024-04-13 LAB
C TRACH RRNA SPEC QL NAA+PROBE: NEGATIVE
N GONORRHOEA DNA SPEC QL PROBE+SIG AMP: NEGATIVE

## 2024-04-21 DIAGNOSIS — G43.909 MIGRAINE, UNSPECIFIED, NOT INTRACTABLE, WITHOUT STATUS MIGRAINOSUS: ICD-10-CM

## 2024-04-22 RX ORDER — RIMEGEPANT SULFATE 75 MG/75MG
TABLET, ORALLY DISINTEGRATING ORAL
Qty: 16 TABLET | Refills: 3 | Status: SHIPPED | OUTPATIENT
Start: 2024-04-22

## 2024-05-10 ENCOUNTER — APPOINTMENT (OUTPATIENT)
Dept: OBSTETRICS AND GYNECOLOGY | Facility: CLINIC | Age: 39
End: 2024-05-10
Payer: COMMERCIAL

## 2024-05-24 ENCOUNTER — OFFICE VISIT (OUTPATIENT)
Dept: OBSTETRICS AND GYNECOLOGY | Facility: CLINIC | Age: 39
End: 2024-05-24
Payer: COMMERCIAL

## 2024-05-24 VITALS — BODY MASS INDEX: 21.22 KG/M2 | DIASTOLIC BLOOD PRESSURE: 60 MMHG | WEIGHT: 116 LBS | SYSTOLIC BLOOD PRESSURE: 98 MMHG

## 2024-05-24 DIAGNOSIS — Z30.431 ENCOUNTER FOR ROUTINE CHECKING OF INTRAUTERINE CONTRACEPTIVE DEVICE (IUD): ICD-10-CM

## 2024-05-24 PROCEDURE — 99213 OFFICE O/P EST LOW 20 MIN: CPT | Performed by: OBSTETRICS & GYNECOLOGY

## 2024-05-24 PROCEDURE — 3078F DIAST BP <80 MM HG: CPT | Performed by: OBSTETRICS & GYNECOLOGY

## 2024-05-24 PROCEDURE — 3074F SYST BP LT 130 MM HG: CPT | Performed by: OBSTETRICS & GYNECOLOGY

## 2024-05-24 RX ORDER — ATOVAQUONE 750 MG/5ML
SUSPENSION ORAL
COMMUNITY
Start: 2024-04-29

## 2024-05-24 NOTE — PROGRESS NOTES
"Chief Complaint   Patient presents with    Follow-up     Liletta IUD Follow Up    [id number]    Liletta IUD placed 24    C/O bleeding or spotting since insertion of the Liletta, some cramping, \"scratchy/pinching\" feeling during intercourse.    Chaperone declined.     Note start brownish spotting nearly daily.  Has felt the IUD string a bit \"scratchy\" with 2 episodes of intercourse.    REVIEW OF SYSTEMS    Please see HPI for reported pertinent positives, which would supersede this ROS    Constitutional:  Denies fever, chills, wt gain or loss, fatigue    Genito-Urinary:  Denies genital lesion or sores, vaginal dryness, itching  or pain.  No abnormal vaginal discharge or unexplained vaginal bleeding.  No dysuria, urinary incontinence or frequency.  Denies pelvic pain, dysmenorrhea or dyspareunia.    Eyes:  Denies vision changes, dryness  ENT:  No hearing loss, sinus pain or congestion, nosebleeds  Cardiovascular:  No chest pain or palpitations  Respiratory:  No SOB, cough, wheezing  GI:  No Nausea, vomiting, diarrhea, constipation, abdominal pain  Musculoskeletal:  No new back pain. joint pain, peripheral edema  Skin:  No rash or skin lesion  Neurologic:  No HA, numbness or dizziness  Psychiatric:  No new anxiety or depression  Endocrine:  No loss of hair or hirsutism  Hematologic/lymphatic:  No swollen lymph nodes.  No reported tendency for easy bruising or bleeding    Patient admits to no other systemic complaints      PHYSICAL EXAM      PSYCH:  Pt is alert, oriented and cooperative    SKIN: warm, dry, w/o lesion    HEAD AND FACE:  External inspection of eyes, ears, functional cranial nerves normal and intact    THYROID:  No thyromegaly    CARDIOVASCULAR:  Warm and well Perfused    PULMONARY:  No respiratory distress    ABDOMEN:  soft, nontender.  No mass or organomegaly.      PELVIC:    External genitalia, urethra, perianal region normal to inspection and palpation if indicated.  No inguinal LA    Vagina " without lesions.    Cervix seen and visually normal  String seen.  2 to 3 cm long.  Trimmed 1 cm.    Assessment/Plan    Diagnoses and all orders for this visit:  Encounter for routine checking of intrauterine contraceptive device (IUD)

## 2024-06-13 ENCOUNTER — OFFICE VISIT (OUTPATIENT)
Dept: HEMATOLOGY/ONCOLOGY | Facility: CLINIC | Age: 39
End: 2024-06-13
Payer: COMMERCIAL

## 2024-06-13 VITALS
SYSTOLIC BLOOD PRESSURE: 135 MMHG | BODY MASS INDEX: 21.49 KG/M2 | WEIGHT: 117.5 LBS | RESPIRATION RATE: 16 BRPM | DIASTOLIC BLOOD PRESSURE: 87 MMHG | TEMPERATURE: 97.9 F | HEART RATE: 65 BPM | OXYGEN SATURATION: 100 %

## 2024-06-13 DIAGNOSIS — R10.9 ABDOMINAL PAIN, UNSPECIFIED ABDOMINAL LOCATION: ICD-10-CM

## 2024-06-13 DIAGNOSIS — D70.9 NEUTROPENIA, UNSPECIFIED TYPE (CMS-HCC): Primary | ICD-10-CM

## 2024-06-13 DIAGNOSIS — Z80.3 FAMILY HISTORY OF BREAST CANCER: ICD-10-CM

## 2024-06-13 DIAGNOSIS — D72.9 ABNORMAL WHITE BLOOD CELL COUNT: ICD-10-CM

## 2024-06-13 DIAGNOSIS — A69.20 LYME DISEASE: ICD-10-CM

## 2024-06-13 DIAGNOSIS — M35.00 SJOGREN'S SYNDROME, WITH UNSPECIFIED ORGAN INVOLVEMENT (MULTI): ICD-10-CM

## 2024-06-13 DIAGNOSIS — R10.9 LEFT SIDED ABDOMINAL PAIN: ICD-10-CM

## 2024-06-13 LAB
ALBUMIN SERPL BCP-MCNC: 4.7 G/DL (ref 3.4–5)
ALP SERPL-CCNC: 40 U/L (ref 33–110)
ALT SERPL W P-5'-P-CCNC: 11 U/L (ref 7–45)
ANION GAP SERPL CALC-SCNC: 13 MMOL/L (ref 10–20)
APTT PPP: 32 SECONDS (ref 27–38)
AST SERPL W P-5'-P-CCNC: 16 U/L (ref 9–39)
BASOPHILS # BLD AUTO: 0.04 X10*3/UL (ref 0–0.1)
BASOPHILS NFR BLD AUTO: 1.2 %
BILIRUB SERPL-MCNC: 0.6 MG/DL (ref 0–1.2)
BUN SERPL-MCNC: 12 MG/DL (ref 6–23)
CALCIUM SERPL-MCNC: 9.3 MG/DL (ref 8.6–10.3)
CENTROMERE B AB SER-ACNC: <0.2 AI
CHLORIDE SERPL-SCNC: 103 MMOL/L (ref 98–107)
CHROMATIN AB SERPL-ACNC: <0.2 AI
CO2 SERPL-SCNC: 26 MMOL/L (ref 21–32)
CREAT SERPL-MCNC: 0.76 MG/DL (ref 0.5–1.05)
CRP SERPL-MCNC: <0.1 MG/DL
DSDNA AB SER-ACNC: <1 IU/ML
EBV EA IGG SER QL: POSITIVE
EBV NA AB SER QL: POSITIVE
EBV VCA IGG SER IA-ACNC: POSITIVE
EBV VCA IGM SER IA-ACNC: POSITIVE
EGFRCR SERPLBLD CKD-EPI 2021: >90 ML/MIN/1.73M*2
ENA JO1 AB SER QL IA: <0.2 AI
ENA RNP AB SER IA-ACNC: <0.2 AI
ENA SCL70 AB SER QL IA: <0.2 AI
ENA SM AB SER IA-ACNC: <0.2 AI
ENA SM+RNP AB SER QL IA: <0.2 AI
ENA SS-A AB SER IA-ACNC: <0.2 AI
ENA SS-B AB SER IA-ACNC: <0.2 AI
EOSINOPHIL # BLD AUTO: 0.06 X10*3/UL (ref 0–0.7)
EOSINOPHIL NFR BLD AUTO: 1.8 %
ERYTHROCYTE [DISTWIDTH] IN BLOOD BY AUTOMATED COUNT: 12.3 % (ref 11.5–14.5)
ERYTHROCYTE [SEDIMENTATION RATE] IN BLOOD BY WESTERGREN METHOD: 17 MM/H (ref 0–20)
FERRITIN SERPL-MCNC: 23 NG/ML (ref 8–150)
GLUCOSE SERPL-MCNC: 94 MG/DL (ref 74–99)
HBV CORE AB SER QL: NONREACTIVE
HBV CORE IGM SER QL: NONREACTIVE
HCT VFR BLD AUTO: 40.6 % (ref 36–46)
HCV AB SER QL: NONREACTIVE
HGB BLD-MCNC: 13.3 G/DL (ref 12–16)
HGB RETIC QN: 35 PG (ref 28–38)
HIV 1+2 AB+HIV1 P24 AG SERPL QL IA: NONREACTIVE
HOLD SPECIMEN: NORMAL
IGA SERPL-MCNC: 197 MG/DL (ref 70–400)
IGG SERPL-MCNC: 976 MG/DL (ref 700–1600)
IGM SERPL-MCNC: 98 MG/DL (ref 40–230)
IMM GRANULOCYTES # BLD AUTO: 0.01 X10*3/UL (ref 0–0.7)
IMM GRANULOCYTES NFR BLD AUTO: 0.3 % (ref 0–0.9)
IMMATURE RETIC FRACTION: 3.8 %
INR PPP: 1 (ref 0.9–1.1)
IRON SATN MFR SERPL: 25 % (ref 25–45)
IRON SERPL-MCNC: 91 UG/DL (ref 35–150)
LYMPHOCYTES # BLD AUTO: 1.36 X10*3/UL (ref 1.2–4.8)
LYMPHOCYTES NFR BLD AUTO: 40.6 %
MCH RBC QN AUTO: 30.7 PG (ref 26–34)
MCHC RBC AUTO-ENTMCNC: 32.8 G/DL (ref 32–36)
MCV RBC AUTO: 94 FL (ref 80–100)
MONOCYTES # BLD AUTO: 0.29 X10*3/UL (ref 0.1–1)
MONOCYTES NFR BLD AUTO: 8.7 %
NEUTROPHILS # BLD AUTO: 1.59 X10*3/UL (ref 1.2–7.7)
NEUTROPHILS NFR BLD AUTO: 47.4 %
NRBC BLD-RTO: ABNORMAL /100{WBCS}
PLATELET # BLD AUTO: 159 X10*3/UL (ref 150–450)
POTASSIUM SERPL-SCNC: 3.8 MMOL/L (ref 3.5–5.3)
PROT SERPL-MCNC: 7.2 G/DL (ref 6.4–8.2)
PROTHROMBIN TIME: 11.6 SECONDS (ref 9.8–12.8)
RBC # BLD AUTO: 4.33 X10*6/UL (ref 4–5.2)
RETICS #: 0.04 X10*6/UL (ref 0.02–0.08)
RETICS/RBC NFR AUTO: 1 % (ref 0.5–2)
RIBOSOMAL P AB SER-ACNC: <0.2 AI
SODIUM SERPL-SCNC: 138 MMOL/L (ref 136–145)
TIBC SERPL-MCNC: 357 UG/DL (ref 240–445)
UIBC SERPL-MCNC: 266 UG/DL (ref 110–370)
VIT B12 SERPL-MCNC: 449 PG/ML (ref 211–911)
WBC # BLD AUTO: 3.4 X10*3/UL (ref 4.4–11.3)

## 2024-06-13 PROCEDURE — 3079F DIAST BP 80-89 MM HG: CPT

## 2024-06-13 PROCEDURE — 82607 VITAMIN B-12: CPT | Mod: GEALAB

## 2024-06-13 PROCEDURE — 86235 NUCLEAR ANTIGEN ANTIBODY: CPT | Mod: GEALAB

## 2024-06-13 PROCEDURE — 82728 ASSAY OF FERRITIN: CPT

## 2024-06-13 PROCEDURE — 83550 IRON BINDING TEST: CPT

## 2024-06-13 PROCEDURE — 86140 C-REACTIVE PROTEIN: CPT

## 2024-06-13 PROCEDURE — 85652 RBC SED RATE AUTOMATED: CPT

## 2024-06-13 PROCEDURE — 86705 HEP B CORE ANTIBODY IGM: CPT | Mod: GEALAB

## 2024-06-13 PROCEDURE — 86664 EPSTEIN-BARR NUCLEAR ANTIGEN: CPT | Mod: GEALAB

## 2024-06-13 PROCEDURE — 85730 THROMBOPLASTIN TIME PARTIAL: CPT

## 2024-06-13 PROCEDURE — 87389 HIV-1 AG W/HIV-1&-2 AB AG IA: CPT | Mod: GEALAB

## 2024-06-13 PROCEDURE — 82525 ASSAY OF COPPER: CPT

## 2024-06-13 PROCEDURE — 36415 COLL VENOUS BLD VENIPUNCTURE: CPT

## 2024-06-13 PROCEDURE — 99215 OFFICE O/P EST HI 40 MIN: CPT

## 2024-06-13 PROCEDURE — 3075F SYST BP GE 130 - 139MM HG: CPT

## 2024-06-13 PROCEDURE — 85045 AUTOMATED RETICULOCYTE COUNT: CPT

## 2024-06-13 PROCEDURE — 86704 HEP B CORE ANTIBODY TOTAL: CPT | Mod: GEALAB

## 2024-06-13 PROCEDURE — 85025 COMPLETE CBC W/AUTO DIFF WBC: CPT

## 2024-06-13 PROCEDURE — 99205 OFFICE O/P NEW HI 60 MIN: CPT

## 2024-06-13 PROCEDURE — 86803 HEPATITIS C AB TEST: CPT | Mod: GEALAB

## 2024-06-13 PROCEDURE — 80053 COMPREHEN METABOLIC PANEL: CPT

## 2024-06-13 PROCEDURE — 82784 ASSAY IGA/IGD/IGG/IGM EACH: CPT | Mod: GEALAB

## 2024-06-13 ASSESSMENT — PAIN SCALES - GENERAL: PAINLEVEL: 2

## 2024-06-13 NOTE — PROGRESS NOTES
"[facility]    PATIENT VISIT INFORMATION    Visit Type: New Visit    Referring Provider: Gordo Leblanc DO  Reason for referral: Abnormal white count   Primary Practice Provider: Gordo Leblanc DO    HEMATOLOGY HISTORY    38-year-old female presents for evaluation of abnormal white cell count.  Referred by her primary care physician.    Leukopenia noted from 2 years ago, but she recall it transiently over the last 20 years, starting in college. Anemic during pregnancy.  Per medical record available the patient's WBCs ranged from 3.1-4.0.  No other notable abnormalities on CBC with differential.    Patient states diagnosis of Sjogren's disease by rheumatology in [place], potentially lupus, POTS, and in [place] 8 to 10 years ago Lyme disease.     HISTORY OF PRESENT ILLNESS     ID Statement: Harper Roberts \"Jose Carlos" is a 38-year-old female    Chief Complaint: Low white count    Interval History:   She presents for evaluation of low WBCs.    She reports Appetite good, no weight loss recently. Lost weight and low appetite right after birth of her daughter 2 years ago.    She notes a flare up a couple months. Chest pain for years, shooting pain across her rib cage over the last 10 years, SOB daily exertional and at rest occasionally, joint pain all over (feet, hands, neck, lower back, knees locking up), trigger finger and elbow pain, received cortisone injection Fall 2023. \"Migraines most of my life with aura and blurry vision.\" About 15 migraines a month. Sometimes ocular migraines. Nose bleeds on an off, worse lately, may go months in between or 6 in one day. Six in one day a few days ago. \"Started during my pregnancy and has continues like a faucet.\"    Notable rashes off and on, started 10 years ago. Pityriasis rosacea diagnosed.  Locations include chest, face, arm. No itch. Gets worse with a hot shower.  Always fatigue, tiered, loss of energy. Not able to nap due " [facility] at bedside for transfer. "to work and children.  Reports a flush feeling. Temp 99F at times. Hot and cold fluctuations. Cold hands and feet all the time. Floaters with eyes. Worse with flare. Followed optometrist regularly and ophthalmologist 6 years ago. Optic nerve drusen diagnosed at age 12 years old. IBS symptoms. EGD and colonoscopy 7 years ago blood in stool and in urine noted at that time. Dull pain in back left side and sharp pain under left rib with deep breathes over the last 10 years. Left ache on lower left abdomen. Swollen hands in morning, unable to wear wedding band. Better in afternoon.    Heavy periods 10 days on and 10 days off and spotting in between. Bleeding light every day since IUD placed two months ago. Biopsy performed. Lesions benign.     Tinnitus 6 months ago. Numbness and tingling hands and feet sometimes face for 10 years off and on. Left breast biopsy 10 years ago diagnosed as fibroadenoma.     Hx of drenching night sweats before and after birth of daughter. Not any in the last 6 months. Oral sores 5-10 location and description as \"lumps on inner cheek and roof of mouth swollen.\" Sore throats daily, seasonal allergie, no other symptoms when they occur.   Recent sinus infection resolved 2 weeks ago.     Diagnosed with Lyme's Disease. Minocycline a couple months at a time. Follows a specialist out of [place].     Denies any lymphadenopathy or consistent neuropathy.  Sometimes urgent loose stool (IBS), Nausea ongoing as baseline, low blood sugar which helps with nausea. No constipation, urinating ok. No blood in urine now.     History of US of kidney and inflammation noted.     PAST/CURRENT HISTORY     MEDICAL/SURGICAL HISTORY  -Leukopenia  -Molar miscarriage for 10-11 weeks   -Preeclampsia both pregnancies   -Emergency  with one pregnancy  -Second pregnancy placenta previa    -Sjogrens   -POTS  -Lupus?   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   -Asthma 10 years ago on inhalers   -Covid 2024 " mild  -possible kidney stone   -Hypoglycemia   -Migraine headaches with aura  -Anemia in pregnancy  -Candidiasis of vagina  -Hypertension  -Lyme's disease    SOCIAL HISTORY  -Lives with  and two kids (2 years and 9 years old)  -Work place:      -Tobacco/smokeless use: denies   -Alcohol: on occasion a couple drinks a month   -Illicit drug or marijuana use: denies   -Amish or Spiritual beliefs: Humanistic   -Social Determinates of Health Concerns: none reported     FAMILY HISTORY  -Mom living 68 years old Rosacea, breast cancer stage 2 HER+ diagnosis at 58 years, lumpectomy and radiation, kidney stones   -Maternal cousin brain tumor  at age 35 years old (many health issues)  -Maternal female cousin autoimmune disease unknown types  -Maternal Uncle end stages of colon stages 74 years old, hx autoimmune and EBV   -Maternal cousin female other autoimmune undiagnosed   -Paternal Uncle 50 years old diagnosed with stage 4 throat cancer non-smoker, living   -Paternal Uncle aortic aneurysm, covid prior  70 years old  -Paternal Uncle  MI (some life style risk factors)   -No other known history of hematologic, bleeding, clotting, autoimmune, genetic, or malignant disorders in the family.     OCCUPATIONAL/ENVIRONMENTAL HISTORY/EXPOSURES:  -None reported    Active Problems, Allergy List, Medication List, and PMH/PSH/FH/Social Hx have been reviewed and reconciled in chart. Updates made when necessary.     REVIEW OF SYSTEMS   A review of systems has been completed and are negative for complaints except what is stated in the assessment, HPI, IH, ROS, and/or past medical history.    ALLERGIES AND MEDICATIONS     Allergies and Intolerances:   Allergies   Allergen Reactions    Cefaclor Rash      Medication Profile:   Current Outpatient Medications   Medication Instructions    levonorgestreL (Liletta) 20.4 mcg/24 hrs (8 yrs) 52 mg intrauterine device intrauterine, Once     "Nurtec ODT 75 mg tablet,disintegrating TAKE 1 TABLET (75 MG) BY MOUTH IF NEEDED (MIGRAINES).      Available Vaccination Record:   Immunization History   Administered Date(s) Administered    Flu vaccine (IIV4), preservative free *Check age/dose* 11/07/2022, 10/12/2023    Hepatitis B vaccine, adult (RECOMBIVAX, ENGERIX) 01/25/2013, 02/22/2013, 08/02/2013    Influenza, Unspecified 10/01/2014    Pfizer Purple Cap SARS-CoV-2 04/10/2021, 05/01/2021, 01/20/2022    Tdap vaccine, age 7 year and older (BOOSTRIX, ADACEL) 06/12/2015, 07/11/2022      PHYSICAL EXAM     Vital Signs/Measurements:       10/12/2023    11:01 AM 10/17/2023    11:53 AM 12/15/2023    10:27 AM 3/11/2024     1:43 PM 4/12/2024    10:22 AM 5/24/2024    11:51 AM 6/13/2024     9:08 AM   Vitals   Systolic 108 104 126 110 116 98 135   Diastolic 70 60 80 72 80 60 87   Heart Rate 75 66 94    65   Temp       36.6 °C (97.9 °F)   Resp       16   Height (in) 1.575 m (5' 2\") 1.575 m (5' 2\")        Weight (lb) 115.6 115 114.2 118 116 116 117.51   BMI 21.14 kg/m2 21.03 kg/m2 20.89 kg/m2 21.58 kg/m2 21.22 kg/m2 21.22 kg/m2 21.49 kg/m2   BSA (m2) 1.51 m2 1.51 m2 1.51 m2 1.53 m2 1.52 m2 1.52 m2 1.53 m2   Visit Report Report Report Report Report  Report Report        Performance:   ECOG Performance Status: 0     Grade ECOG performance status   0 Fully active, able to carry on all pre-disease performance without restriction   1 Restricted in physically strenuous activity but ambulatory and able to carry out work of a light or sedentary nature, e.g., light housework, office work   2 Ambulatory and capable of all selfcare but unable to carry out any work activities; Up and about more than 50% of waking hours   3 Capable of only limited selfcare, confined to bed or chair more than 50% of waking hours   4 Completely disabled; Cannot carry out any selfcare; Totally confined to bed or chair   5 Dead     Physical Exam:  General: Patient is awake/alert/oriented x3, no distress, " Nourished, hydrated, alert and cooperative, ambulating without difficulty  Skin: Expected color for ethnicity, good turgor, dry, no prominent lesions, blotchy redness across chest diminished during visit, no unusual bruising, or bleeding   Hair: Normal texture and distribution   Nails: Normal color, no deformities    HEENT:   Head: Normocephalic, atraumatic, no visible or masses, depressions, or scarring   Eyes: Conjunctiva clear, sclera non-icteric, no exudates or hemorrhages   Ears: nl appearance, hearing intact    Nose: no external lesions, mucosa non-inflamed, no rhinorrhea   Mouth: Mucous membranes moist, no lesions, sores, bleeding, or erythema     Head/Neck: Neck supple, no apparent injury, thyroid without mass or tenderness, No JVD, trachea midline, no bruits appreciated    Respiratory/Thorax: Patent airways, CTAB, chest symmetry, normal inspiratory and expiratory effort    Cardiovascular: Regular rate and rhythm, no murmur or gallop, no carotid bruit or thrills    Gastrointestinal: Bowel sounds normal, non-distended, soft, mild tenderness L side, no masses or hernia, or organomegaly appreciated    Genitourinary: deferred   Musculoskeletal: Normal gait, normal range of motion, no pain on palpation of spine, no deformity, normal strength for baseline, no atrophy, and no CVA tenderness appreciated   Extremities: No amputations or deformities, cyanosis, edema or viscosities, peripheral pulses intact   Neurological: Sensation present to touch, intact senses, motor response and reflexes normal, normal strength   Breast:    Lymphatic: No significant lymphadenopathy   Psychological: Intact recent and remote memory, judgement, and insight. Appropriate mood, affect, and behavior          RESULTS/DATA     Labs:   Lab Results   Component Value Date    WBC 3.4 (L) 06/13/2024    NEUTROABS 1.59 06/13/2024    IGABSOL 0.01 06/13/2024    LYMPHSABS 1.36 06/13/2024    MONOSABS 0.29 06/13/2024    EOSABS 0.06 06/13/2024     "BASOSABS 0.04 06/13/2024    RBC 4.33 06/13/2024    MCV 94 06/13/2024    MCHC 32.8 06/13/2024    HGB 13.3 06/13/2024    HCT 40.6 06/13/2024     06/13/2024     Lab Results   Component Value Date    RETICCTPCT 1.0 06/13/2024      Lab Results   Component Value Date    CREATININE 0.76 06/13/2024    BUN 12 06/13/2024    EGFR >90 06/13/2024     06/13/2024    K 3.8 06/13/2024     06/13/2024    CO2 26 06/13/2024      Lab Results   Component Value Date    ALT 11 06/13/2024    AST 16 06/13/2024    ALKPHOS 40 06/13/2024    BILITOT 0.6 06/13/2024      Lab Results   Component Value Date    TSH 2.21 03/08/2024     Lab Results   Component Value Date    TSH 2.21 03/08/2024     Lab Results   Component Value Date    IRON 91 06/13/2024    TIBC 357 06/13/2024    FERRITIN 23 06/13/2024      Lab Results   Component Value Date    IGJJUMZB82 449 06/13/2024        Lab Results   Component Value Date    SEDRATE 17 06/13/2024      Lab Results   Component Value Date    CRP <0.10 06/13/2024      No results found for: \"COURTNEY\"  Lab Results   Component Value Date     08/26/2022        Radiology/Studies:   US abdomen ordered     ASSESSMENT/PLAN     Assessment and Plan:   #1. Leukopenia   38-year-old female presents for evaluation of abnormal white cell count.  Referred by her primary care physician.    Leukopenia noted from 2 years ago, but she recall it transiently over the last 20 years, starting in college. Anemic during pregnancy.  Per medical record available the patient's WBCs ranged from 3.1-4.0.  No other notable abnormalities on CBC with differential.    Patient states diagnosis of Sjogren's disease by rheumatology in [place], potentially lupus, POTS, and in [place] 8 to 10 years ago Lyme disease.       Discussed some of the potential causes of leukopenia including nutritional deficiency, autoimmune, inflammatory or illness, bone marrow dysplasia or genetics.     **Please follow with specialties as scheduled " for other comorbidities and routine health screenings.**    I have reviewed the patient's medical record including provider notes, laboratory and testing results, imaging, and procedures available within the system and outside the system pertinent to patient care.     Follow up:    RTC:  -2 to 3 weeks to review labs    Medications:  -N/A    Imaging/Testing:  -Ultrasound of abdomen to assess left side abdominal pain    Referral:  -N/A    Other Pertinent Appointments:  -Genetics 11/7/2024 due to family history of breast cancer and colon cancer      Patient Discussion Summary:  Discussed plan of care in detail. Patient states understanding and in agreement. Answered all questions. They will call with any additional questions and/or concerns.    Thank you for allowing me to participate in your care. It was a pleasure meeting you.    Sincerely,  Henna Greco, APRN-CNP       This document may have been written by voice recognition software.  There may be some incorrect wording, spelling and/or spelling errors or punctuation errors that were not corrected prior to committing the note to the medical record. Please request clarification if there is documentation error or clarification is needed.   Time based billing: Please see documentation within this specific encounter.

## 2024-06-14 DIAGNOSIS — D70.9 NEUTROPENIA, UNSPECIFIED TYPE (CMS-HCC): Primary | ICD-10-CM

## 2024-06-15 ENCOUNTER — LAB (OUTPATIENT)
Dept: LAB | Facility: LAB | Age: 39
End: 2024-06-15
Payer: COMMERCIAL

## 2024-06-15 DIAGNOSIS — D70.9 NEUTROPENIA, UNSPECIFIED TYPE (CMS-HCC): ICD-10-CM

## 2024-06-15 LAB
EBV EA IGG SER QL: POSITIVE
EBV NA AB SER QL: POSITIVE
EBV VCA IGG SER IA-ACNC: POSITIVE
EBV VCA IGM SER IA-ACNC: POSITIVE

## 2024-06-15 PROCEDURE — 36415 COLL VENOUS BLD VENIPUNCTURE: CPT

## 2024-06-15 PROCEDURE — 87799 DETECT AGENT NOS DNA QUANT: CPT

## 2024-06-16 LAB
EBV DNA SPEC NAA+PROBE-LOG#: NORMAL {LOG_COPIES}/ML
LABORATORY COMMENT REPORT: NOT DETECTED

## 2024-06-17 LAB — ANA SER QL HEP2 SUBST: NEGATIVE

## 2024-06-19 ENCOUNTER — LAB (OUTPATIENT)
Dept: LAB | Facility: LAB | Age: 39
End: 2024-06-19
Payer: COMMERCIAL

## 2024-06-19 ENCOUNTER — HOSPITAL ENCOUNTER (OUTPATIENT)
Dept: RADIOLOGY | Facility: CLINIC | Age: 39
Discharge: HOME | End: 2024-06-19
Payer: COMMERCIAL

## 2024-06-19 DIAGNOSIS — Z80.3 FAMILY HISTORY OF BREAST CANCER: ICD-10-CM

## 2024-06-19 DIAGNOSIS — R39.9 UTI SYMPTOMS: ICD-10-CM

## 2024-06-19 DIAGNOSIS — D72.9 ABNORMAL WHITE BLOOD CELL COUNT: ICD-10-CM

## 2024-06-19 DIAGNOSIS — R10.9 LEFT SIDED ABDOMINAL PAIN: ICD-10-CM

## 2024-06-19 DIAGNOSIS — R39.9 UTI SYMPTOMS: Primary | ICD-10-CM

## 2024-06-19 DIAGNOSIS — D70.9 NEUTROPENIA, UNSPECIFIED TYPE (CMS-HCC): ICD-10-CM

## 2024-06-19 DIAGNOSIS — R10.9 ABDOMINAL PAIN, UNSPECIFIED ABDOMINAL LOCATION: ICD-10-CM

## 2024-06-19 LAB
APPEARANCE UR: ABNORMAL
BACTERIA #/AREA URNS AUTO: ABNORMAL /HPF
BILIRUB UR STRIP.AUTO-MCNC: NEGATIVE MG/DL
COLOR UR: YELLOW
GLUCOSE UR STRIP.AUTO-MCNC: NORMAL MG/DL
HYALINE CASTS #/AREA URNS AUTO: ABNORMAL /LPF
KETONES UR STRIP.AUTO-MCNC: NEGATIVE MG/DL
LEUKOCYTE ESTERASE UR QL STRIP.AUTO: ABNORMAL
MUCOUS THREADS #/AREA URNS AUTO: ABNORMAL /LPF
NITRITE UR QL STRIP.AUTO: NEGATIVE
PH UR STRIP.AUTO: 6 [PH]
PROT UR STRIP.AUTO-MCNC: ABNORMAL MG/DL
RBC # UR STRIP.AUTO: ABNORMAL /UL
RBC #/AREA URNS AUTO: >20 /HPF
SP GR UR STRIP.AUTO: 1.02
SQUAMOUS #/AREA URNS AUTO: ABNORMAL /HPF
UROBILINOGEN UR STRIP.AUTO-MCNC: NORMAL MG/DL
WBC #/AREA URNS AUTO: >50 /HPF

## 2024-06-19 PROCEDURE — 76700 US EXAM ABDOM COMPLETE: CPT | Performed by: RADIOLOGY

## 2024-06-19 PROCEDURE — 87086 URINE CULTURE/COLONY COUNT: CPT

## 2024-06-19 PROCEDURE — 76700 US EXAM ABDOM COMPLETE: CPT

## 2024-06-19 PROCEDURE — 81001 URINALYSIS AUTO W/SCOPE: CPT

## 2024-06-19 PROCEDURE — 87186 SC STD MICRODIL/AGAR DIL: CPT

## 2024-06-20 DIAGNOSIS — R39.9 UTI SYMPTOMS: Primary | ICD-10-CM

## 2024-06-20 RX ORDER — CIPROFLOXACIN 500 MG/1
500 TABLET ORAL 2 TIMES DAILY
Qty: 14 TABLET | Refills: 0 | Status: SHIPPED | OUTPATIENT
Start: 2024-06-20 | End: 2024-06-27

## 2024-06-22 LAB — BACTERIA UR CULT: ABNORMAL

## 2024-06-27 ENCOUNTER — TELEMEDICINE (OUTPATIENT)
Dept: HEMATOLOGY/ONCOLOGY | Facility: CLINIC | Age: 39
End: 2024-06-27
Payer: COMMERCIAL

## 2024-06-27 DIAGNOSIS — D70.9 NEUTROPENIA, UNSPECIFIED TYPE (CMS-HCC): ICD-10-CM

## 2024-06-27 DIAGNOSIS — D72.9 ABNORMAL WHITE BLOOD CELL COUNT: Primary | ICD-10-CM

## 2024-06-27 DIAGNOSIS — R10.9 ABDOMINAL PAIN, UNSPECIFIED ABDOMINAL LOCATION: ICD-10-CM

## 2024-06-27 DIAGNOSIS — R10.9 LEFT SIDED ABDOMINAL PAIN: ICD-10-CM

## 2024-06-27 DIAGNOSIS — E61.1 IRON DEFICIENCY: ICD-10-CM

## 2024-06-27 DIAGNOSIS — Z80.3 FAMILY HISTORY OF BREAST CANCER: ICD-10-CM

## 2024-06-27 PROCEDURE — 99443 PR PHYS/QHP TELEPHONE EVALUATION 21-30 MIN: CPT

## 2024-06-27 NOTE — PROGRESS NOTES
"[facility]    PATIENT VISIT INFORMATION    Visit Type: Follow up   I performed this visit using real-time telehealth tools, including an audio/video connection between (Cassie Roberts at her home) and (Henna Greco, JESUS at [facility])  Patient consents to telemedicine service today and understands the limitations of the visit, no physical examination and all issues may not be able to be addressed today, other than brief neuro and psych assessment.   Referring Provider: Gordo Leblanc DO  Reason for referral: Abnormal white count   Primary Practice Provider: Gordo Leblanc DO    HEMATOLOGY HISTORY    38-year-old female presents for evaluation of abnormal white cell count.  Referred by her primary care physician.    Leukopenia noted from 2 years ago, but she recall it transiently over the last 20 years, starting in college. Anemic during pregnancy.  Per medical record available the patient's WBCs ranged from 3.1-4.0.  No other notable abnormalities on CBC with differential.    Patient states diagnosis of Sjogren's disease by rheumatology in [place], potentially lupus, POTS, and in [place] 8 to 10 years ago Lyme disease.     HISTORY OF PRESENT ILLNESS     ID Statement: Harper Roberts \"Jose Carlos" is a 38-year-old female    Chief Complaint: Low white count    Interval History:   She presents for follow up of low WBCs.  She had recent UTI with flank pain and hematuria, thought a stone. Urine positive for hyaline casts.   She reports Appetite good, no weight loss recently. Lost weight and low appetite right after birth of her daughter 2 years ago.     She notes a flare up a couple months. Chest pain for years, shooting pain across her rib cage over the last 10 years, SOB daily exertional and at rest occasionally, joint pain all over (feet, hands, neck, lower back, knees locking up), trigger finger and elbow pain, received cortisone injection Fall 2023. \"Migraines most of " "my life with aura and blurry vision.\" About 15 migraines a month. Sometimes ocular migraines. Nose bleeds on an off, worse lately, may go months in between or 6 in one day. Six in one day a few days ago. \"Started during my pregnancy and has continues like a faucet.\"    Notable rashes off and on, started 10 years ago. Pityriasis rosacea diagnosed.  Locations include chest, face, arm. No itch. Gets worse with a hot shower.  Always fatigue, tiered, loss of energy. Not able to nap due to work and children.  Reports a flush feeling. Temp 99F at times. Hot and cold fluctuations. Cold hands and feet all the time. Floaters with eyes. Worse with flare. Followed optometrist regularly and ophthalmologist 6 years ago. Optic nerve drusen diagnosed at age 12 years old. IBS symptoms. EGD and colonoscopy 7 years ago blood in stool and in urine noted at that time. Dull pain in back left side and sharp pain under left rib with deep breathes over the last 10 years. Left ache on lower left abdomen. Swollen hands in morning, unable to wear wedding band. Better in afternoon.    Heavy periods 10 days on and 10 days off and spotting in between. Bleeding light every day since IUD placed two months ago. Biopsy performed. Lesions benign.     Tinnitus 6 months ago. Numbness and tingling hands and feet sometimes face for 10 years off and on. Left breast biopsy 10 years ago diagnosed as fibroadenoma.     Hx of drenching night sweats before and after birth of daughter. Not any in the last 6 months. Oral sores 5-10 location and description as \"lumps on inner cheek and roof of mouth swollen.\" Sore throats daily, seasonal allergie, no other symptoms when they occur.   Recent sinus infection resolved 2 weeks ago.     Diagnosed with Lyme's Disease. Minocycline a couple months at a time. Follows a specialist out of [place].     Denies any lymphadenopathy or consistent neuropathy.  Sometimes urgent loose stool (IBS), Nausea ongoing as baseline, low " blood sugar which helps with nausea. No constipation, urinating ok. No blood in urine now.     History of US of kidney and inflammation noted.     PAST/CURRENT HISTORY     MEDICAL/SURGICAL HISTORY  -Leukopenia  -Molar miscarriage for 10-11 weeks   -Preeclampsia both pregnancies   -Emergency  with one pregnancy  -Second pregnancy placenta previa    -Sjogrens   -POTS  -Lupus?   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   -Asthma 10 years ago on inhalers   -Covid 2024 mild  -possible kidney stone   -Hypoglycemia   -Migraine headaches with aura  -Anemia in pregnancy  -Candidiasis of vagina  -Hypertension  -Lyme's disease    SOCIAL HISTORY  -Lives with  and two kids (2 years and 9 years old)  -Work place:      -Tobacco/smokeless use: denies   -Alcohol: on occasion a couple drinks a month   -Illicit drug or marijuana use: denies   -Jain or Spiritual beliefs: Humanistic   -Social Determinates of Health Concerns: none reported     FAMILY HISTORY  -Mom living 68 years old Rosacea, breast cancer stage 2 HER+ diagnosis at 58 years, lumpectomy and radiation, kidney stones   -Maternal cousin brain tumor  at age 35 years old (many health issues)  -Maternal female cousin autoimmune disease unknown types  -Maternal Uncle end stages of colon stages 74 years old, hx autoimmune and EBV   -Maternal cousin female other autoimmune undiagnosed   -Paternal Uncle 50 years old diagnosed with stage 4 throat cancer non-smoker, living   -Paternal Uncle aortic aneurysm, covid prior  70 years old  -Paternal Uncle  MI (some life style risk factors)   -No other known history of hematologic, bleeding, clotting, autoimmune, genetic, or malignant disorders in the family.     OCCUPATIONAL/ENVIRONMENTAL HISTORY/EXPOSURES:  -None reported    Active Problems, Allergy List, Medication List, and PMH/PSH/FH/Social Hx have been reviewed and reconciled in chart. Updates made  "when necessary.     REVIEW OF SYSTEMS   A review of systems has been completed and are negative for complaints except what is stated in the assessment, HPI, IH, ROS, and/or past medical history.    ALLERGIES AND MEDICATIONS     Allergies and Intolerances:   Allergies   Allergen Reactions    Cefaclor Rash      Medication Profile:   Current Outpatient Medications   Medication Instructions    ciprofloxacin (CIPRO) 500 mg, oral, 2 times daily    levonorgestreL (Liletta) 20.4 mcg/24 hrs (8 yrs) 52 mg intrauterine device intrauterine, Once    Nurtec ODT 75 mg tablet,disintegrating TAKE 1 TABLET (75 MG) BY MOUTH IF NEEDED (MIGRAINES).      Available Vaccination Record:   Immunization History   Administered Date(s) Administered    Flu vaccine (IIV4), preservative free *Check age/dose* 11/07/2022, 10/12/2023    Hepatitis B vaccine, adult *Check Product/Dose* 01/25/2013, 02/22/2013, 08/02/2013    Influenza, Unspecified 10/01/2014    Pfizer Purple Cap SARS-CoV-2 04/10/2021, 05/01/2021, 01/20/2022    Tdap vaccine, age 7 year and older (BOOSTRIX, ADACEL) 06/12/2015, 07/11/2022      PHYSICAL EXAM     Vital Signs/Measurements:       10/12/2023    11:01 AM 10/17/2023    11:53 AM 12/15/2023    10:27 AM 3/11/2024     1:43 PM 4/12/2024    10:22 AM 5/24/2024    11:51 AM 6/13/2024     9:08 AM   Vitals   Systolic 108 104 126 110 116 98 135   Diastolic 70 60 80 72 80 60 87   Heart Rate 75 66 94    65   Temp       36.6 °C (97.9 °F)   Resp       16   Height (in) 1.575 m (5' 2\") 1.575 m (5' 2\")        Weight (lb) 115.6 115 114.2 118 116 116 117.51   BMI 21.14 kg/m2 21.03 kg/m2 20.89 kg/m2 21.58 kg/m2 21.22 kg/m2 21.22 kg/m2 21.49 kg/m2   BSA (m2) 1.51 m2 1.51 m2 1.51 m2 1.53 m2 1.52 m2 1.52 m2 1.53 m2   Visit Report Report Report Report Report  Report Report        Performance:   ECOG Performance Status: 0     Grade ECOG performance status   0 Fully active, able to carry on all pre-disease performance without restriction   1 Restricted in " "physically strenuous activity but ambulatory and able to carry out work of a light or sedentary nature, e.g., light housework, office work   2 Ambulatory and capable of all selfcare but unable to carry out any work activities; Up and about more than 50% of waking hours   3 Capable of only limited selfcare, confined to bed or chair more than 50% of waking hours   4 Completely disabled; Cannot carry out any selfcare; Totally confined to bed or chair   5 Dead     Physical Exam:  General: Patient is awake/alert/oriented x3, no distress   Psychological: Intact recent and remote memory, judgement, and insight. Appropriate mood, affect, and behavior     RESULTS/DATA     Labs:   Lab Results   Component Value Date    WBC 3.4 (L) 06/13/2024    NEUTROABS 1.59 06/13/2024    IGABSOL 0.01 06/13/2024    LYMPHSABS 1.36 06/13/2024    MONOSABS 0.29 06/13/2024    EOSABS 0.06 06/13/2024    BASOSABS 0.04 06/13/2024    RBC 4.33 06/13/2024    MCV 94 06/13/2024    MCHC 32.8 06/13/2024    HGB 13.3 06/13/2024    HCT 40.6 06/13/2024     06/13/2024     Lab Results   Component Value Date    RETICCTPCT 1.0 06/13/2024      Lab Results   Component Value Date    CREATININE 0.76 06/13/2024    BUN 12 06/13/2024    EGFR >90 06/13/2024     06/13/2024    K 3.8 06/13/2024     06/13/2024    CO2 26 06/13/2024      Lab Results   Component Value Date    ALT 11 06/13/2024    AST 16 06/13/2024    ALKPHOS 40 06/13/2024    BILITOT 0.6 06/13/2024      Lab Results   Component Value Date    TSH 2.21 03/08/2024     Lab Results   Component Value Date    TSH 2.21 03/08/2024     Lab Results   Component Value Date    IRON 91 06/13/2024    TIBC 357 06/13/2024    FERRITIN 23 06/13/2024      Lab Results   Component Value Date    UBXLJYHX93 449 06/13/2024        Lab Results   Component Value Date    LANE Negative 06/13/2024    SEDRATE 17 06/13/2024      Lab Results   Component Value Date    CRP <0.10 06/13/2024      No results found for: \"COURTNEY\"  Lab Results "   Component Value Date     08/26/2022        Radiology/Studies:   US abdomen ordered     ASSESSMENT/PLAN     Assessment and Plan:   #1. Leukopenia   38-year-old female presents for evaluation of abnormal white cell count.  Referred by her primary care physician.    Leukopenia noted from 2 years ago, but she recall it transiently over the last 20 years, starting in college. Anemic during pregnancy.  Per medical record available the patient's WBCs ranged from 3.1-4.0.  No other notable abnormalities on CBC with differential.    Patient states diagnosis of Sjogren's disease by rheumatology in [place], potentially lupus, POTS, and in [place] 8 to 10 years ago Lyme disease.       Discussed some of the potential causes of leukopenia including nutritional deficiency, autoimmune, inflammatory or illness, bone marrow dysplasia or genetics.     Recent EBV infections. PCR negative. Mono sometime in recent past.  LANE and inflammatory markers negative.  Hepatitis B, C and HIV negative. US abdomen unremarkable.   Notably borderline B12 and iron.  Tsat 25% and ferritin of 23.  She will start a B12 supplement take daily to every other day.  B12 1000 mg.  She was started on low-dose iron with vitamin C. We will reassess in 3 months. Rheum consult may not be necessary at this time.     **Please follow with specialties as scheduled for other comorbidities and routine health screenings.**    I have reviewed the patient's medical record including provider notes, laboratory and testing results, imaging, and procedures available within the system and outside the system pertinent to patient care.     Follow up:    RTC:  -2 to 3 weeks to review labs    Medications:  -N/A    Imaging/Testing:  -Ultrasound of abdomen to assess left side abdominal pain    Referral:  -N/A    Other Pertinent Appointments:  -Genetics 11/7/2024 due to family history of breast cancer and colon cancer      Patient Discussion Summary:  Discussed plan  of care in detail. Patient states understanding and in agreement. Answered all questions. They will call with any additional questions and/or concerns.    Thank you for allowing me to participate in your care. It was a pleasure meeting you.    Sincerely,  Henna Greco, APRN-CNP       This document may have been written by voice recognition software.  There may be some incorrect wording, spelling and/or spelling errors or punctuation errors that were not corrected prior to committing the note to the medical record. Please request clarification if there is documentation error or clarification is needed.   Time based billing: Please see documentation within this specific encounter.

## 2024-08-09 ENCOUNTER — APPOINTMENT (OUTPATIENT)
Dept: RHEUMATOLOGY | Facility: CLINIC | Age: 39
End: 2024-08-09
Payer: COMMERCIAL

## 2024-08-12 ENCOUNTER — APPOINTMENT (OUTPATIENT)
Dept: RHEUMATOLOGY | Facility: CLINIC | Age: 39
End: 2024-08-12
Payer: COMMERCIAL

## 2024-08-12 ENCOUNTER — LAB (OUTPATIENT)
Dept: LAB | Facility: LAB | Age: 39
End: 2024-08-12
Payer: COMMERCIAL

## 2024-08-12 VITALS — DIASTOLIC BLOOD PRESSURE: 71 MMHG | SYSTOLIC BLOOD PRESSURE: 117 MMHG | BODY MASS INDEX: 21.4 KG/M2 | WEIGHT: 117 LBS

## 2024-08-12 DIAGNOSIS — M25.50 POLYARTHRALGIA: Primary | ICD-10-CM

## 2024-08-12 DIAGNOSIS — M35.00 SJOGREN'S SYNDROME, WITH UNSPECIFIED ORGAN INVOLVEMENT (MULTI): ICD-10-CM

## 2024-08-12 DIAGNOSIS — M25.50 POLYARTHRALGIA: ICD-10-CM

## 2024-08-12 LAB
ALBUMIN SERPL BCP-MCNC: 4.5 G/DL (ref 3.4–5)
ALP SERPL-CCNC: 49 U/L (ref 33–110)
ALT SERPL W P-5'-P-CCNC: 11 U/L (ref 7–45)
ANION GAP SERPL CALC-SCNC: 14 MMOL/L (ref 10–20)
AST SERPL W P-5'-P-CCNC: 15 U/L (ref 9–39)
BILIRUB SERPL-MCNC: 0.6 MG/DL (ref 0–1.2)
BUN SERPL-MCNC: 14 MG/DL (ref 6–23)
CALCIUM SERPL-MCNC: 9.3 MG/DL (ref 8.6–10.6)
CCP IGG SERPL-ACNC: <1 U/ML
CHLORIDE SERPL-SCNC: 103 MMOL/L (ref 98–107)
CO2 SERPL-SCNC: 25 MMOL/L (ref 21–32)
CREAT SERPL-MCNC: 0.82 MG/DL (ref 0.5–1.05)
EGFRCR SERPLBLD CKD-EPI 2021: >90 ML/MIN/1.73M*2
GLUCOSE SERPL-MCNC: 93 MG/DL (ref 74–99)
POTASSIUM SERPL-SCNC: 4.4 MMOL/L (ref 3.5–5.3)
PROT SERPL-MCNC: 7.2 G/DL (ref 6.4–8.2)
RHEUMATOID FACT SER NEPH-ACNC: <10 IU/ML (ref 0–15)
SODIUM SERPL-SCNC: 138 MMOL/L (ref 136–145)
URATE SERPL-MCNC: 3.8 MG/DL (ref 2.3–6.7)

## 2024-08-12 PROCEDURE — 36415 COLL VENOUS BLD VENIPUNCTURE: CPT

## 2024-08-12 PROCEDURE — 99204 OFFICE O/P NEW MOD 45 MIN: CPT | Performed by: INTERNAL MEDICINE

## 2024-08-12 PROCEDURE — 86431 RHEUMATOID FACTOR QUANT: CPT

## 2024-08-12 PROCEDURE — 84550 ASSAY OF BLOOD/URIC ACID: CPT

## 2024-08-12 PROCEDURE — 3074F SYST BP LT 130 MM HG: CPT | Performed by: INTERNAL MEDICINE

## 2024-08-12 PROCEDURE — 80053 COMPREHEN METABOLIC PANEL: CPT

## 2024-08-12 PROCEDURE — 3078F DIAST BP <80 MM HG: CPT | Performed by: INTERNAL MEDICINE

## 2024-08-12 PROCEDURE — 86200 CCP ANTIBODY: CPT

## 2024-08-12 PROCEDURE — 1036F TOBACCO NON-USER: CPT | Performed by: INTERNAL MEDICINE

## 2024-08-12 ASSESSMENT — ENCOUNTER SYMPTOMS
HEMATOLOGIC/LYMPHATIC NEGATIVE: 1
GASTROINTESTINAL NEGATIVE: 1
PSYCHIATRIC NEGATIVE: 1
CONSTITUTIONAL NEGATIVE: 1
BACK PAIN: 1
ALLERGIC/IMMUNOLOGIC NEGATIVE: 1
ENDOCRINE NEGATIVE: 1
MYALGIAS: 1
NEUROLOGICAL NEGATIVE: 1
SHORTNESS OF BREATH: 1

## 2024-08-12 NOTE — PROGRESS NOTES
"Subjective  . Harper Roberts \"Cassie\" is a 38 y.o. female who presents for New Patient Visit and Joint Pain.    HPI.  38-year-old female with history of chronic Lyme disease, Sjogren syndrome, intermittent mild leukopenia, left breast fibroadenoma, asthma, migraine headaches, pityriasis rosea, migraine headaches, anemia in pregnancy, POTS, preeclampsia and molar pregnancy referred through hematology for autoimmune disease evaluation.    She stated that she was in [place] and was bitten by a take at the age of 11.  She has had no bull's-eye rash specific for Lyme disease.  She states she was diagnosed with chronic Lyme disease at the age of 31 when she was in [place].  She could not tolerate doxycycline.  She has been treated periodically with minocycline and atovaquone.  Currently she is following with Lyme disease specialist Dr. Morris in [place] who suggested to resume minocycline and atovaquone recently.    She reports chronic intermittent pain in hands, knees and low back.  She states the pain in the hands is worse with associated swelling and weakness.  She has difficulty to take of the wedding ring in the morning.  Hand pain is worse in the morning up to 5/10 at times.  She also reports locking of the right middle finger.  She received corticosteroid injection for trigger finger about a year ago.  Left knee hurts more than right in the morning.  Low back pain was worse in the morning and now it is tolerable.  She has not noticed swelling of the knees.    She reports dry eyes, dry mouth and dry skin.  She saw a rheumatologist about 7 years ago in [place] and was diagnosed with Sjogren syndrome.  There was no formal diagnosis of lupus.  No lip biopsy was done.  Sicca symptoms are not too bad.    She reports redness of the face and the chest.  It is not as bad as it was in the past.  She stated that she was told that she has pityriasis rosea.  She has migraine headaches 2 weeks in a month.  She " takes Nurtec.  She reports floaters.  She is following with ophthalmology.  She was diagnosed with optic drusen several years ago.  She reports chronic ringing in the ears.  She reports shortness of breath at rest and with exertion.  She stated that she has POTS.  However tilt table test obtained in 2022 was consistent with orthostatic intolerance.    Social history: She is .  She does not smoke.  She socially drinks.  Family history: Mother has breast cancer, high cholesterol and high blood pressure.  Father has noninvasive skin cancer and migraine headaches.  Surgical history: D&C,  section and umbilical hernia repair.    Review of Systems   Constitutional: Negative.    HENT: Negative.     Eyes:         Floaters   Respiratory:  Positive for shortness of breath.    Gastrointestinal: Negative.    Endocrine: Negative.    Genitourinary: Negative.    Musculoskeletal:  Positive for back pain and myalgias.   Allergic/Immunologic: Negative.    Neurological: Negative.    Hematological: Negative.    Psychiatric/Behavioral: Negative.       Objective     Blood pressure 117/71, weight 53.1 kg (117 lb).    Physical Exam.  Gen. AAO x3, NAD.  HEENT: No pallor or icterus, PERRLA, EOMI. Oropharynx is clear. MM moist,Parotid glands  not enlarged. No cervical lymphadenopathy .  Skin: No rashes.  Heart: S1, S2/ RRR. No murmurs or gallops.  Lungs: CTA B.  Abdomen: Soft, NT/ND, BS regular.  MSK: No.swelling or tenderness of the  upper or lower extremity joints with full ROM, Neck,spine and Winston SI with out tenderness.Finger to floor distance 0.  Neuro: CN II-XII intact. Sensation to touch intact.Strength 5/5 throughout. DTR 2+ and symmetrical.  Psych:Appropriate mood and behavior  EXT: No edema    Assessment/Plan  . 38-year-old female with history of chronic Lyme disease, Sjogren syndrome, intermittent mild leukopenia, left breast fibroadenoma, asthma, migraine headaches, pityriasis rosea, migraine headaches,  anemia in pregnancy, POTS, preeclampsia and molar pregnancy presented with chronic intermittent mild leukopenia, polyarthralgia and sicca symptoms.    #1: Chronic polyarthralgia.  No synovitis on physical examination.  She has been periodically treated with minocycline and after working on for chronic Lyme disease diagnosed at the age of 31.  We will obtain labs to rule out crystal arthropathy and rheumatoid arthritis.  She is to follow-up with Lyme specialist.    #2: Sicca symptoms.  Blood work obtained about 2 months ago showed negative LANE with reflex YSABEL and normal inflammatory markers.  Hepatitis B, C serology was negative however EBV serology was positive.  EBV PCR is negative.  Discussed with patient that it is unlikely she has lupus for Sjogren syndrome.       This note was partially generated using the Dragon Voice recognition system. There may be some incorrect wording, spelling and/or spelling errors or punctuation errors that were not corrected prior to committing the note to the medical record.    Problem List Items Addressed This Visit    None  Visit Diagnoses       Polyarthralgia    -  Primary    Relevant Orders    Rheumatoid Factor    Citrulline Antibody, IgG    Uric Acid    Sjogren's syndrome, with unspecified organ involvement (Multi)                     Active Ambulatory Problems     Diagnosis Date Noted    Migraine headache 08/30/2010    POTS (postural orthostatic tachycardia syndrome) 12/15/2023    Anemia complicating childbirth ([facility]) 08/29/2022    Candidiasis of vagina 04/03/2024    Fibroadenoma of breast 04/03/2024    Hypertension 04/03/2024    Leukopenia 04/03/2024    Lyme disease 04/03/2024     Resolved Ambulatory Problems     Diagnosis Date Noted    38 weeks gestation of pregnancy ([facility]) 04/03/2024    Breech presentation, antepartum ([facility]) 04/03/2024    Gestational hypertension, third trimester ([facility]) 04/03/2024    Irritable bowel syndrome without diarrhea 09/01/2022     Postoperative vaginal bleeding following genitourinary procedure 2024    Pregnancy-induced hypertension, delivered ([facility]) 2024    Severe pre-eclampsia, complicating the puerperium ([facility]) 2022    Acquired trigger finger of left middle finger 2024    Abnormal uterine bleeding 2024    Acute posthemorrhagic anemia 2022    Breast pain 2024    Breech presentation with  problem ([facility]) 2024    Congestion of paranasal sinus 2024    Contact with and (suspected) exposure to covid-19 2022    Dizziness 2024    Dry eyes, bilateral 2017    Pain of foot 2024     Past Medical History:   Diagnosis Date    11 weeks gestation of pregnancy ([facility]) 2022    15 weeks gestation of pregnancy ([facility]) 2022    20 weeks gestation of pregnancy ([facility]) 2022    23 weeks gestation of pregnancy ([facility])     26 weeks gestation of pregnancy ([facility]) 2022    27 weeks gestation of pregnancy ([facility]) 06/10/2022    28 weeks gestation of pregnancy ([facility]) 2022    31 weeks gestation of pregnancy ([facility]) 2022    33 weeks gestation of pregnancy ([facility]) 2022    36 weeks gestation of pregnancy ([facility]) 2022    37 weeks gestation of pregnancy ([facility]) 2022    Abnormal findings on diagnostic imaging of other specified body structures 2021    Acute vaginitis 2021    Acute vaginitis 2022    Encounter for follow-up examination after completed treatment for conditions other than malignant neoplasm 2021    Encounter for gynecological examination (general) (routine) without abnormal findings 02/10/2021    Encounter for screening for human papillomavirus (HPV) 02/10/2021    Encounter for screening for infections with a predominantly sexual mode of transmission 04/15/2021    Encounter for screening for malignant neoplasm of cervix 02/10/2021    Encounter for supervision of other  normal pregnancy, first trimester ([facility]) 2022    Encounter for supervision of other normal pregnancy, first trimester ([facility]) 2021    Encounter for supervision of other normal pregnancy, second trimester ([facility]) 2022    Encounter for supervision of other normal pregnancy, third trimester ([facility]) 2022    Hypertrophy of uterus 2021    Inconclusive mammogram 10/27/2020    Low lying placenta nos or without hemorrhage, unspecified trimester ([facility]) 2022    Maternal care for unspecified type scar from previous  delivery ([facility]) 04/15/2021    Other complications following an ectopic and molar pregnancy ([facility]) 2021    Other conditions influencing health status 2021    Other conditions influencing health status 01/10/2022    Other conditions influencing health status     Other specified pregnancy related conditions, unspecified trimester ([facility]) 2022    Other specified symptoms and signs involving the digestive system and abdomen 2021    Pelvic and perineal pain 2021    Personal history of malignant neoplasm of breast 2019    Personal history of other benign neoplasm 10/27/2020    Personal history of other complications of pregnancy, childbirth and the puerperium 04/15/2021    Personal history of other diseases of the female genital tract 05/10/2021    Personal history of other diseases of the female genital tract 2021    Personal history of other infectious and parasitic diseases 2021    Pregnancy with inconclusive fetal viability, not applicable or unspecified ([facility]) 04/15/2021    Pregnancy with inconclusive fetal viability, not applicable or unspecified ([facility]) 2021    Secondary amenorrhea 01/10/2022    Spotting complicating pregnancy, third trimester ([facility]) 06/10/2022    Vomiting of pregnancy, unspecified ([facility]) 04/15/2021       No family history on file.    Past Surgical History:   Procedure  Laterality Date    OTHER SURGICAL HISTORY  2019    Hernia repair    OTHER SURGICAL HISTORY  2019     section    OTHER SURGICAL HISTORY  10/03/2022    Breast biopsy core needle    OTHER SURGICAL HISTORY  2021    Dilation and curettage       Social History     Tobacco Use   Smoking Status Never   Smokeless Tobacco Never       Allergies  Cefaclor    Current Meds  Current Outpatient Medications   Medication Instructions    levonorgestreL (Liletta) 20.4 mcg/24 hrs (8 yrs) 52 mg intrauterine device intrauterine, Once    Nurtec ODT 75 mg tablet,disintegrating TAKE 1 TABLET (75 MG) BY MOUTH IF NEEDED (MIGRAINES).        Lab Results   Component Value Date    SEDRATE 17 2024    CRP <0.10 2024    URICACID 3.8 2022    DNADS <1.0 2024             Marlon Tyler MD

## 2024-08-12 NOTE — LETTER
"August 12, 2024     Gordo Leblanc DO  [address]  [facility], [address]  [facility] [address]    Patient: Cassie Roberts   YOB: 1985   Date of Visit: 8/12/2024       Dear Dr. Gordo Leblanc DO:    Thank you for referring Cassie Roberts to me for evaluation. Below are my notes for this consultation.  If you have questions, please do not hesitate to call me. I look forward to following your patient along with you.       Sincerely,     Marlon Tyler MD      CC: Henna Greco, APRN-CNP  ______________________________________________________________________________________    Subjective . Harper Roberts \"Jose Carlos" is a 38 y.o. female who presents for New Patient Visit and Joint Pain.    HPI.  38-year-old female with history of chronic Lyme disease, Sjogren syndrome, intermittent mild leukopenia, left breast fibroadenoma, asthma, migraine headaches, pityriasis rosea, migraine headaches, anemia in pregnancy, POTS, preeclampsia and molar pregnancy referred through hematology for autoimmune disease evaluation.    She stated that she was in [place] and was bitten by a take at the age of 11.  She has had no bull's-eye rash specific for Lyme disease.  She states she was diagnosed with chronic Lyme disease at the age of 31 when she was in [place].  She could not tolerate doxycycline.  She has been treated periodically with minocycline and atovaquone.  Currently she is following with Lyme disease specialist Dr. Morris in [place] who suggested to resume minocycline and atovaquone recently.    She reports chronic intermittent pain in hands, knees and low back.  She states the pain in the hands is worse with associated swelling and weakness.  She has difficulty to take of the wedding ring in the morning.  Hand pain is worse in the morning up to 5/10 at times.  She also reports locking of the right middle finger.  She received corticosteroid injection for trigger finger about a year " ago.  Left knee hurts more than right in the morning.  Low back pain was worse in the morning and now it is tolerable.  She has not noticed swelling of the knees.    She reports dry eyes, dry mouth and dry skin.  She saw a rheumatologist about 7 years ago in [place] and was diagnosed with Sjogren syndrome.  There was no formal diagnosis of lupus.  No lip biopsy was done.  Sicca symptoms are not too bad.    She reports redness of the face and the chest.  It is not as bad as it was in the past.  She stated that she was told that she has pityriasis rosea.  She has migraine headaches 2 weeks in a month.  She takes Nurtec.  She reports floaters.  She is following with ophthalmology.  She was diagnosed with optic drusen several years ago.  She reports chronic ringing in the ears.  She reports shortness of breath at rest and with exertion.  She stated that she has POTS.  However tilt table test obtained in 2022 was consistent with orthostatic intolerance.    Social history: She is .  She does not smoke.  She socially drinks.  Family history: Mother has breast cancer, high cholesterol and high blood pressure.  Father has noninvasive skin cancer and migraine headaches.  Surgical history: D&C,  section and umbilical hernia repair.    Review of Systems   Constitutional: Negative.    HENT: Negative.     Eyes:         Floaters   Respiratory:  Positive for shortness of breath.    Gastrointestinal: Negative.    Endocrine: Negative.    Genitourinary: Negative.    Musculoskeletal:  Positive for back pain and myalgias.   Allergic/Immunologic: Negative.    Neurological: Negative.    Hematological: Negative.    Psychiatric/Behavioral: Negative.       Objective    Blood pressure 117/71, weight 53.1 kg (117 lb).    Physical Exam.  Gen. AAO x3, NAD.  HEENT: No pallor or icterus, PERRLA, EOMI. Oropharynx is clear. MM moist,Parotid glands  not enlarged. No cervical lymphadenopathy .  Skin: No rashes.  Heart: S1,  S2/ RRR. No murmurs or gallops.  Lungs: CTA B.  Abdomen: Soft, NT/ND, BS regular.  MSK: No.swelling or tenderness of the  upper or lower extremity joints with full ROM, Neck,spine and Winston SI with out tenderness.Finger to floor distance 0.  Neuro: CN II-XII intact. Sensation to touch intact.Strength 5/5 throughout. DTR 2+ and symmetrical.  Psych:Appropriate mood and behavior  EXT: No edema    Assessment/Plan . 38-year-old female with history of chronic Lyme disease, Sjogren syndrome, intermittent mild leukopenia, left breast fibroadenoma, asthma, migraine headaches, pityriasis rosea, migraine headaches, anemia in pregnancy, POTS, preeclampsia and molar pregnancy presented with chronic intermittent mild leukopenia, polyarthralgia and sicca symptoms.    #1: Chronic polyarthralgia.  No synovitis on physical examination.  She has been periodically treated with minocycline and after working on for chronic Lyme disease diagnosed at the age of 31.  We will obtain labs to rule out crystal arthropathy and rheumatoid arthritis.  She is to follow-up with Lyme specialist.    #2: Sicca symptoms.  Blood work obtained about 2 months ago showed negative LANE with reflex YSABEL and normal inflammatory markers.  Hepatitis B, C serology was negative however EBV serology was positive.  EBV PCR is negative.  Discussed with patient that it is unlikely she has lupus for Sjogren syndrome.       This note was partially generated using the Dragon Voice recognition system. There may be some incorrect wording, spelling and/or spelling errors or punctuation errors that were not corrected prior to committing the note to the medical record.    Problem List Items Addressed This Visit    None  Visit Diagnoses       Polyarthralgia    -  Primary    Relevant Orders    Rheumatoid Factor    Citrulline Antibody, IgG    Uric Acid    Sjogren's syndrome, with unspecified organ involvement (Multi)                     Active Ambulatory Problems     Diagnosis Date  Noted   • Migraine headache 2010   • POTS (postural orthostatic tachycardia syndrome) 12/15/2023   • Anemia complicating childbirth ([facility]) 2022   • Candidiasis of vagina 2024   • Fibroadenoma of breast 2024   • Hypertension 2024   • Leukopenia 2024   • Lyme disease 2024     Resolved Ambulatory Problems     Diagnosis Date Noted   • 38 weeks gestation of pregnancy ([facility]) 2024   • Breech presentation, antepartum ([facility]) 2024   • Gestational hypertension, third trimester ([facility]) 2024   • Irritable bowel syndrome without diarrhea 2022   • Postoperative vaginal bleeding following genitourinary procedure 2024   • Pregnancy-induced hypertension, delivered ([facility]) 2024   • Severe pre-eclampsia, complicating the puerperium ([facility]) 2022   • Acquired trigger finger of left middle finger 2024   • Abnormal uterine bleeding 2024   • Acute posthemorrhagic anemia 2022   • Breast pain 2024   • Breech presentation with  problem ([facility]) 2024   • Congestion of paranasal sinus 2024   • Contact with and (suspected) exposure to covid-19 2022   • Dizziness 2024   • Dry eyes, bilateral 2017   • Pain of foot 2024     Past Medical History:   Diagnosis Date   • 11 weeks gestation of pregnancy ([facility]) 2022   • 15 weeks gestation of pregnancy ([facility]) 2022   • 20 weeks gestation of pregnancy ([facility]) 2022   • 23 weeks gestation of pregnancy ([facility])    • 26 weeks gestation of pregnancy ([facility]) 2022   • 27 weeks gestation of pregnancy ([facility]) 06/10/2022   • 28 weeks gestation of pregnancy ([facility]) 2022   • 31 weeks gestation of pregnancy ([facility]) 2022   • 33 weeks gestation of pregnancy ([facility]) 2022   • 36 weeks gestation of pregnancy ([facility]) 2022   • 37 weeks gestation of pregnancy ([facility]) 2022   • Abnormal  findings on diagnostic imaging of other specified body structures 2021   • Acute vaginitis 2021   • Acute vaginitis 2022   • Encounter for follow-up examination after completed treatment for conditions other than malignant neoplasm 2021   • Encounter for gynecological examination (general) (routine) without abnormal findings 02/10/2021   • Encounter for screening for human papillomavirus (HPV) 02/10/2021   • Encounter for screening for infections with a predominantly sexual mode of transmission 04/15/2021   • Encounter for screening for malignant neoplasm of cervix 02/10/2021   • Encounter for supervision of other normal pregnancy, first trimester ([facility]) 2022   • Encounter for supervision of other normal pregnancy, first trimester ([facility]) 2021   • Encounter for supervision of other normal pregnancy, second trimester ([facility]) 2022   • Encounter for supervision of other normal pregnancy, third trimester ([facility]) 2022   • Hypertrophy of uterus 2021   • Inconclusive mammogram 10/27/2020   • Low lying placenta nos or without hemorrhage, unspecified trimester ([facility]) 2022   • Maternal care for unspecified type scar from previous  delivery ([facility]) 04/15/2021   • Other complications following an ectopic and molar pregnancy ([facility]) 2021   • Other conditions influencing health status 2021   • Other conditions influencing health status 01/10/2022   • Other conditions influencing health status    • Other specified pregnancy related conditions, unspecified trimester ([facility]) 2022   • Other specified symptoms and signs involving the digestive system and abdomen 2021   • Pelvic and perineal pain 2021   • Personal history of malignant neoplasm of breast 2019   • Personal history of other benign neoplasm 10/27/2020   • Personal history of other complications of pregnancy, childbirth and the puerperium 04/15/2021   •  Personal history of other diseases of the female genital tract 05/10/2021   • Personal history of other diseases of the female genital tract 2021   • Personal history of other infectious and parasitic diseases 2021   • Pregnancy with inconclusive fetal viability, not applicable or unspecified ([facility]) 04/15/2021   • Pregnancy with inconclusive fetal viability, not applicable or unspecified ([facility]) 2021   • Secondary amenorrhea 01/10/2022   • Spotting complicating pregnancy, third trimester ([facility]) 06/10/2022   • Vomiting of pregnancy, unspecified ([facility]) 04/15/2021       No family history on file.    Past Surgical History:   Procedure Laterality Date   • OTHER SURGICAL HISTORY  2019    Hernia repair   • OTHER SURGICAL HISTORY  2019     section   • OTHER SURGICAL HISTORY  10/03/2022    Breast biopsy core needle   • OTHER SURGICAL HISTORY  2021    Dilation and curettage       Social History     Tobacco Use   Smoking Status Never   Smokeless Tobacco Never       Allergies  Cefaclor    Current Meds  Current Outpatient Medications   Medication Instructions   • levonorgestreL (Liletta) 20.4 mcg/24 hrs (8 yrs) 52 mg intrauterine device intrauterine, Once   • Nurtec ODT 75 mg tablet,disintegrating TAKE 1 TABLET (75 MG) BY MOUTH IF NEEDED (MIGRAINES).        Lab Results   Component Value Date    SEDRATE 17 2024    CRP <0.10 2024    URICACID 3.8 2022    DNADS <1.0 2024             Marlon Tyler MD

## 2024-08-30 ENCOUNTER — LAB (OUTPATIENT)
Dept: LAB | Facility: LAB | Age: 39
End: 2024-08-30
Payer: COMMERCIAL

## 2024-08-30 DIAGNOSIS — R39.9 UTI SYMPTOMS: ICD-10-CM

## 2024-08-30 DIAGNOSIS — R39.9 UTI SYMPTOMS: Primary | ICD-10-CM

## 2024-08-30 PROCEDURE — 87186 SC STD MICRODIL/AGAR DIL: CPT

## 2024-08-30 PROCEDURE — 87086 URINE CULTURE/COLONY COUNT: CPT

## 2024-08-30 RX ORDER — NITROFURANTOIN 25; 75 MG/1; MG/1
100 CAPSULE ORAL 2 TIMES DAILY
Qty: 14 CAPSULE | Refills: 0 | Status: SHIPPED | OUTPATIENT
Start: 2024-08-30 | End: 2024-09-06

## 2024-09-01 LAB — BACTERIA UR CULT: ABNORMAL

## 2024-09-17 DIAGNOSIS — G43.909 MIGRAINE, UNSPECIFIED, NOT INTRACTABLE, WITHOUT STATUS MIGRAINOSUS: ICD-10-CM

## 2024-09-24 ENCOUNTER — LAB (OUTPATIENT)
Dept: LAB | Facility: LAB | Age: 39
End: 2024-09-24
Payer: COMMERCIAL

## 2024-09-24 DIAGNOSIS — D70.9 NEUTROPENIA, UNSPECIFIED (CMS-HCC): ICD-10-CM

## 2024-09-24 DIAGNOSIS — M06.4 INFLAMMATORY POLYARTHROPATHY (MULTI): Primary | ICD-10-CM

## 2024-09-24 PROCEDURE — 86431 RHEUMATOID FACTOR QUANT: CPT

## 2024-09-24 PROCEDURE — 36415 COLL VENOUS BLD VENIPUNCTURE: CPT

## 2024-09-24 PROCEDURE — 82785 ASSAY OF IGE: CPT

## 2024-09-24 PROCEDURE — 86200 CCP ANTIBODY: CPT

## 2024-09-24 PROCEDURE — 82784 ASSAY IGA/IGD/IGG/IGM EACH: CPT

## 2024-09-25 LAB
CCP IGG SERPL-ACNC: <1 U/ML
IGA SERPL-MCNC: 228 MG/DL (ref 70–400)
IGE SERPL-ACNC: 11 IU/ML (ref 0–214)
IGG SERPL-MCNC: 964 MG/DL (ref 700–1600)
IGG SERPL-MCNC: 964 MG/DL (ref 700–1600)
IGG1 SER-MCNC: 654 MG/DL (ref 490–1140)
IGG2 SER-MCNC: 347 MG/DL (ref 150–640)
IGG3 SER-MCNC: 21 MG/DL (ref 11–85)
IGG4 SER-MCNC: 10 MG/DL (ref 3–200)
IGM SERPL-MCNC: 114 MG/DL (ref 40–230)
RHEUMATOID FACT SER NEPH-ACNC: <10 IU/ML (ref 0–15)

## 2024-09-27 ENCOUNTER — LAB (OUTPATIENT)
Dept: LAB | Facility: LAB | Age: 39
End: 2024-09-27
Payer: COMMERCIAL

## 2024-09-27 ENCOUNTER — OFFICE VISIT (OUTPATIENT)
Dept: URGENT CARE | Age: 39
End: 2024-09-27
Payer: COMMERCIAL

## 2024-09-27 VITALS
DIASTOLIC BLOOD PRESSURE: 84 MMHG | HEART RATE: 70 BPM | SYSTOLIC BLOOD PRESSURE: 119 MMHG | OXYGEN SATURATION: 99 % | RESPIRATION RATE: 18 BRPM | TEMPERATURE: 98 F

## 2024-09-27 DIAGNOSIS — J01.40 ACUTE PANSINUSITIS, RECURRENCE NOT SPECIFIED: Primary | ICD-10-CM

## 2024-09-27 DIAGNOSIS — E61.1 IRON DEFICIENCY: ICD-10-CM

## 2024-09-27 DIAGNOSIS — D72.9 ABNORMAL WHITE BLOOD CELL COUNT: ICD-10-CM

## 2024-09-27 LAB
ALBUMIN SERPL BCP-MCNC: 4.4 G/DL (ref 3.4–5)
ALP SERPL-CCNC: 43 U/L (ref 33–110)
ALT SERPL W P-5'-P-CCNC: 9 U/L (ref 7–45)
ANION GAP SERPL CALC-SCNC: 8 MMOL/L (ref 10–20)
AST SERPL W P-5'-P-CCNC: 13 U/L (ref 9–39)
BASOPHILS # BLD AUTO: 0.03 X10*3/UL (ref 0–0.1)
BASOPHILS NFR BLD AUTO: 0.6 %
BILIRUB SERPL-MCNC: 0.6 MG/DL (ref 0–1.2)
BUN SERPL-MCNC: 13 MG/DL (ref 6–23)
CALCIUM SERPL-MCNC: 8.9 MG/DL (ref 8.6–10.3)
CHLORIDE SERPL-SCNC: 104 MMOL/L (ref 98–107)
CO2 SERPL-SCNC: 29 MMOL/L (ref 21–32)
CREAT SERPL-MCNC: 0.77 MG/DL (ref 0.5–1.05)
EGFRCR SERPLBLD CKD-EPI 2021: >90 ML/MIN/1.73M*2
EOSINOPHIL # BLD AUTO: 0.04 X10*3/UL (ref 0–0.7)
EOSINOPHIL NFR BLD AUTO: 0.8 %
ERYTHROCYTE [DISTWIDTH] IN BLOOD BY AUTOMATED COUNT: 12.4 % (ref 11.5–14.5)
FERRITIN SERPL-MCNC: 33 NG/ML (ref 8–150)
GLUCOSE SERPL-MCNC: 57 MG/DL (ref 74–99)
HCT VFR BLD AUTO: 41.5 % (ref 36–46)
HGB BLD-MCNC: 13.8 G/DL (ref 12–16)
IMM GRANULOCYTES # BLD AUTO: 0.01 X10*3/UL (ref 0–0.7)
IMM GRANULOCYTES NFR BLD AUTO: 0.2 % (ref 0–0.9)
IRON SATN MFR SERPL: 18 % (ref 25–45)
IRON SERPL-MCNC: 51 UG/DL (ref 35–150)
LYMPHOCYTES # BLD AUTO: 1.12 X10*3/UL (ref 1.2–4.8)
LYMPHOCYTES NFR BLD AUTO: 23 %
MCH RBC QN AUTO: 31.2 PG (ref 26–34)
MCHC RBC AUTO-ENTMCNC: 33.3 G/DL (ref 32–36)
MCV RBC AUTO: 94 FL (ref 80–100)
MONOCYTES # BLD AUTO: 0.59 X10*3/UL (ref 0.1–1)
MONOCYTES NFR BLD AUTO: 12.1 %
NEUTROPHILS # BLD AUTO: 3.09 X10*3/UL (ref 1.2–7.7)
NEUTROPHILS NFR BLD AUTO: 63.3 %
NRBC BLD-RTO: 0 /100 WBCS (ref 0–0)
PLATELET # BLD AUTO: 153 X10*3/UL (ref 150–450)
POC SARS-COV-2 AG BINAX: NORMAL
POTASSIUM SERPL-SCNC: 3.9 MMOL/L (ref 3.5–5.3)
PROT SERPL-MCNC: 6.8 G/DL (ref 6.4–8.2)
RBC # BLD AUTO: 4.42 X10*6/UL (ref 4–5.2)
SODIUM SERPL-SCNC: 137 MMOL/L (ref 136–145)
TIBC SERPL-MCNC: 288 UG/DL (ref 240–445)
UIBC SERPL-MCNC: 237 UG/DL (ref 110–370)
VIT B12 SERPL-MCNC: 307 PG/ML (ref 211–911)
WBC # BLD AUTO: 4.9 X10*3/UL (ref 4.4–11.3)

## 2024-09-27 PROCEDURE — 85025 COMPLETE CBC W/AUTO DIFF WBC: CPT

## 2024-09-27 PROCEDURE — 36415 COLL VENOUS BLD VENIPUNCTURE: CPT

## 2024-09-27 PROCEDURE — 82607 VITAMIN B-12: CPT

## 2024-09-27 PROCEDURE — 82728 ASSAY OF FERRITIN: CPT

## 2024-09-27 PROCEDURE — 83550 IRON BINDING TEST: CPT

## 2024-09-27 PROCEDURE — 83540 ASSAY OF IRON: CPT

## 2024-09-27 PROCEDURE — 80053 COMPREHEN METABOLIC PANEL: CPT

## 2024-09-27 RX ORDER — AMOXICILLIN AND CLAVULANATE POTASSIUM 875; 125 MG/1; MG/1
1 TABLET, FILM COATED ORAL 2 TIMES DAILY
Qty: 20 TABLET | Refills: 0 | Status: SHIPPED | OUTPATIENT
Start: 2024-09-27 | End: 2024-10-07

## 2024-09-27 ASSESSMENT — ENCOUNTER SYMPTOMS
CHILLS: 0
WHEEZING: 0
SHORTNESS OF BREATH: 0
VOMITING: 0
FEVER: 0
COUGH: 0
FATIGUE: 1
SNORING: 0
SWOLLEN GLANDS: 0
HOARSE VOICE: 0
RHINORRHEA: 1
HEADACHES: 1
SORE THROAT: 0
VERTIGO: 0
NAUSEA: 0

## 2024-09-27 NOTE — PROGRESS NOTES
"Subjective   Patient ID: Harper Roberts \"Jose Carlos" is a 39 y.o. female. They present today with a chief complaint of Sinusitis (Headaches 5 days, congestion 2 days).    History of Present Illness    History provided by:  Patient   used: No    Sinusitis  Pain details:     Location:  Maxillary    Quality:  Pressure    Severity:  Mild    Duration:  5 days  Duration:  5 days  Progression:  Worsening  Chronicity:  New  Context: not allergies, not chemical odor, not deviated nasal septum, not recent URI and not smoke inhalation    Relieved by:  Nothing  Worsened by:  Nothing  Ineffective treatments: Advil, Nurtec and warm steam.  Associated symptoms: congestion, fatigue, headaches and rhinorrhea    Associated symptoms: no chest pain, no chills, no cough, no ear pain, no fever, no hoarse voice, no mouth breathing, no nausea, no shortness of breath, no sneezing, no snoring, no sore throat, no swollen glands, no tooth pain, no vertigo, no vomiting and no wheezing        Past Medical History  Allergies as of 09/27/2024 - Reviewed 09/27/2024   Allergen Reaction Noted    Cefaclor Rash 03/25/2010    Doxycycline Rash 09/27/2024       (Not in a hospital admission)       Past Medical History:   Diagnosis Date    11 weeks gestation of pregnancy ([facility]) 02/18/2022    11 weeks gestation of pregnancy    15 weeks gestation of pregnancy ([facility]) 04/04/2022    15 weeks gestation of pregnancy    20 weeks gestation of pregnancy ([facility]) 05/05/2022    20 weeks gestation of pregnancy    23 weeks gestation of pregnancy ([facility])     23 weeks gestation of pregnancy    26 weeks gestation of pregnancy ([facility]) 05/16/2022    26 weeks gestation of pregnancy    27 weeks gestation of pregnancy ([facility]) 06/10/2022    27 weeks gestation of pregnancy    28 weeks gestation of pregnancy ([facility]) 06/20/2022    28 weeks gestation of pregnancy    31 weeks gestation of pregnancy ([facility]) 07/11/2022    31 weeks gestation of pregnancy "    33 weeks gestation of pregnancy ([facility]) 2022    33 weeks gestation of pregnancy    36 weeks gestation of pregnancy ([facility]) 2022    36 weeks gestation of pregnancy    37 weeks gestation of pregnancy ([facility]) 2022    37 weeks gestation of pregnancy    38 weeks gestation of pregnancy ([facility]) 2024    Abnormal findings on diagnostic imaging of other specified body structures 2021    Thickened endometrium    Abnormal uterine bleeding 2024    Acquired trigger finger of left middle finger 2024    Acute posthemorrhagic anemia 2022    Acute vaginitis 2021    Bacterial vaginosis    Acute vaginitis 2022    Acute vaginitis    Breast pain 2024    Breech presentation with  problem ([facility]) 2024    Congestion of paranasal sinus 2024    Dizziness 2024    Dry eyes, bilateral 2017    Encounter for follow-up examination after completed treatment for conditions other than malignant neoplasm 2021    Postop check    Encounter for gynecological examination (general) (routine) without abnormal findings 02/10/2021    Women's annual routine gynecological examination    Encounter for screening for human papillomavirus (HPV) 02/10/2021    Screening for HPV (human papillomavirus)    Encounter for screening for infections with a predominantly sexual mode of transmission 04/15/2021    Screening for STD (sexually transmitted disease)    Encounter for screening for malignant neoplasm of cervix 02/10/2021    Screening for cervical cancer    Encounter for supervision of other normal pregnancy, first trimester ([facility]) 2022    Encounter for supervision of normal pregnancy in multigravida in first trimester    Encounter for supervision of other normal pregnancy, first trimester ([facility]) 2021    Multigravida in first trimester    Encounter for supervision of other normal pregnancy, second trimester ([facility]) 2022     Encounter for supervision of normal pregnancy in multigravida in second trimester    Encounter for supervision of other normal pregnancy, third trimester ([facility]) 2022    Encounter for supervision of normal pregnancy in multigravida in third trimester    Gestational hypertension, third trimester ([facility]) 2024    Hypertrophy of uterus 2021    Enlarged uterus    Inconclusive mammogram 10/27/2020    Dense breast tissue    Irritable bowel syndrome without diarrhea 2022    Low lying placenta nos or without hemorrhage, unspecified trimester ([facility]) 2022    Low-lying placenta without hemorrhage    Maternal care for unspecified type scar from previous  delivery ([facility]) 04/15/2021    Uterine scar from previous  delivery complicating pregnancy    Other complications following an ectopic and molar pregnancy ([facility]) 2021    Partial molar pregnancy    Other conditions influencing health status 2021    Patient requests second opinion    Other conditions influencing health status 01/10/2022    History of pregnancy    Other conditions influencing health status     History of pregnancy    Other specified pregnancy related conditions, unspecified trimester ([facility]) 2022    Cramping affecting pregnancy, antepartum    Other specified symptoms and signs involving the digestive system and abdomen 2021    Abdominal fullness    Pain of foot 2024    Pelvic and perineal pain 2021    Pelvic pain in female    Personal history of malignant neoplasm of breast 2019    History of breast cancer in female    Personal history of other benign neoplasm 10/27/2020    History of other benign neoplasm    Personal history of other complications of pregnancy, childbirth and the puerperium 04/15/2021    History of pregnancy induced hypertension    Personal history of other diseases of the female genital tract 05/10/2021    History of vaginal discharge    Personal  history of other diseases of the female genital tract 2021    History of abnormal uterine bleeding    Personal history of other infectious and parasitic diseases 2021    History of candidiasis of vagina    Postoperative vaginal bleeding following genitourinary procedure 2024    Pregnancy with inconclusive fetal viability, not applicable or unspecified ([facility]) 04/15/2021    Encounter to determine fetal viability of pregnancy    Pregnancy with inconclusive fetal viability, not applicable or unspecified ([facility]) 2021    Pregnancy with inconclusive fetal viability    Pregnancy-induced hypertension, delivered ([facility]) 2024    Secondary amenorrhea 01/10/2022    Secondary amenorrhea    Severe pre-eclampsia, complicating the puerperium ([facility]) 2022    Spotting complicating pregnancy, third trimester ([facility]) 06/10/2022    Spotting affecting pregnancy in third trimester    Vomiting of pregnancy, unspecified ([facility]) 04/15/2021    Nausea and vomiting in pregnancy       Past Surgical History:   Procedure Laterality Date    OTHER SURGICAL HISTORY  2019    Hernia repair    OTHER SURGICAL HISTORY  2019     section    OTHER SURGICAL HISTORY  10/03/2022    Breast biopsy core needle    OTHER SURGICAL HISTORY  2021    Dilation and curettage        reports that she has never smoked. She has never used smokeless tobacco. She reports current alcohol use. She reports that she does not use drugs.    Review of Systems  Review of Systems   Constitutional:  Positive for fatigue. Negative for chills and fever.   HENT:  Positive for congestion and rhinorrhea. Negative for ear pain, hoarse voice, sneezing and sore throat.    Respiratory:  Negative for snoring, cough, shortness of breath and wheezing.    Cardiovascular:  Negative for chest pain.   Gastrointestinal:  Negative for nausea and vomiting.   Neurological:  Positive for headaches. Negative for vertigo.       Objective     Vitals:    09/27/24 1846   BP: 119/84   Pulse: 70   Resp: 18   Temp: 36.7 °C (98 °F)   SpO2: 99%     No LMP recorded.    Physical Exam  Vitals and nursing note reviewed.   Constitutional:       Appearance: Normal appearance.   HENT:      Head: Normocephalic and atraumatic.      Right Ear: Hearing, tympanic membrane, ear canal and external ear normal.      Left Ear: Hearing, tympanic membrane, ear canal and external ear normal.      Nose: Mucosal edema, congestion and rhinorrhea present. No nasal deformity, septal deviation, signs of injury, laceration or nasal tenderness.      Right Sinus: No maxillary sinus tenderness or frontal sinus tenderness.      Left Sinus: No maxillary sinus tenderness or frontal sinus tenderness.      Mouth/Throat:      Lips: Pink.      Mouth: Mucous membranes are moist.      Pharynx: Uvula midline.      Tonsils: No tonsillar exudate or tonsillar abscesses.   Cardiovascular:      Rate and Rhythm: Normal rate and regular rhythm.      Heart sounds: Normal heart sounds.   Pulmonary:      Effort: Pulmonary effort is normal.      Breath sounds: Normal breath sounds and air entry.   Musculoskeletal:      Cervical back: Normal range of motion and neck supple.   Lymphadenopathy:      Cervical: No cervical adenopathy.   Neurological:      Mental Status: She is alert.   Psychiatric:         Mood and Affect: Mood normal.         Behavior: Behavior normal.         Procedures    Point of Care Test & Imaging Results from this visit  Results for orders placed or performed in visit on 09/27/24   POCT Covid-19 Rapid Antigen   Result Value Ref Range    POC TRUDY-COV-2 AG  Presumptive negative test for SARS-CoV-2 (no antigen detected)     Presumptive negative test for SARS-CoV-2 (no antigen detected)      No results found.    Diagnostic study results (if any) were reviewed by RAI Christian.    Assessment/Plan   Allergies, medications, history, and pertinent labs/EKGs/Imaging reviewed by Pelon Lemons  APRN-CNP.     The antibiotic was prescribed due to severe symptoms.  Take the antibiotic with food.  Eat yogurt or take probiotic once a day.  Symptoms should improve in 2 to 3 days.   Wash your hands often, especially after coughing or touching your nose or mouth.  Use disposable tissues instead of handkerchiefs.  Increase fluid intake and rest as needed.  Take Tylenol and/or ibuprofen as needed for aches and pain and/or fever.  Return or follow-up with primary care provider if symptoms did not improve.  Call 911 or go to the nearest emergency room if symptoms became severe such as fever of 102.5 degrees Fahrenheit or 39.2 degrees Celsius, severe pain, shortness of breath, chest tightness.   Patient verbalized understanding of the instructions and left in stable condition.    Medical Decision Making      Orders and Diagnoses  Diagnoses and all orders for this visit:  Acute pansinusitis, recurrence not specified  -     POCT Covid-19 Rapid Antigen  -     amoxicillin-pot clavulanate (Augmentin) 875-125 mg tablet; Take 1 tablet by mouth 2 times a day for 10 days.      Medical Admin Record      Patient disposition: Home    Electronically signed by RAI Christian  7:06 PM

## 2024-09-27 NOTE — PATIENT INSTRUCTIONS
The antibiotic was prescribed due to severe symptoms.  Take the antibiotic with food.  Eat yogurt or take probiotic once a day.  Symptoms should improve in 2 to 3 days.   Wash your hands often, especially after coughing or touching your nose or mouth.  Use disposable tissues instead of handkerchiefs.  Increase fluid intake and rest as needed.  Take Tylenol and/or ibuprofen as needed for aches and pain and/or fever.  Return or follow-up with primary care provider if symptoms did not improve.  Call 911 or go to the nearest emergency room if symptoms became severe such as fever of 102.5 degrees Fahrenheit or 39.2 degrees Celsius, severe pain, shortness of breath, chest tightness.

## 2024-10-02 ENCOUNTER — APPOINTMENT (OUTPATIENT)
Dept: HEMATOLOGY/ONCOLOGY | Facility: CLINIC | Age: 39
End: 2024-10-02
Payer: COMMERCIAL

## 2024-10-09 ENCOUNTER — TELEMEDICINE (OUTPATIENT)
Dept: HEMATOLOGY/ONCOLOGY | Facility: CLINIC | Age: 39
End: 2024-10-09
Payer: COMMERCIAL

## 2024-10-09 DIAGNOSIS — D50.8 IRON DEFICIENCY ANEMIA SECONDARY TO INADEQUATE DIETARY IRON INTAKE: Primary | ICD-10-CM

## 2024-10-09 DIAGNOSIS — E16.2 HYPOGLYCEMIA: ICD-10-CM

## 2024-10-09 DIAGNOSIS — D72.9 ABNORMAL WHITE BLOOD CELL COUNT: ICD-10-CM

## 2024-10-09 DIAGNOSIS — E61.1 IRON DEFICIENCY: ICD-10-CM

## 2024-10-09 PROCEDURE — 99214 OFFICE O/P EST MOD 30 MIN: CPT

## 2024-10-09 RX ORDER — FAMOTIDINE 10 MG/ML
20 INJECTION INTRAVENOUS ONCE AS NEEDED
OUTPATIENT
Start: 2024-10-16

## 2024-10-09 RX ORDER — HEPARIN SODIUM,PORCINE/PF 10 UNIT/ML
50 SYRINGE (ML) INTRAVENOUS AS NEEDED
OUTPATIENT
Start: 2024-10-16

## 2024-10-09 RX ORDER — EPINEPHRINE 0.3 MG/.3ML
0.3 INJECTION SUBCUTANEOUS EVERY 5 MIN PRN
OUTPATIENT
Start: 2024-10-16

## 2024-10-09 RX ORDER — ALBUTEROL SULFATE 0.83 MG/ML
3 SOLUTION RESPIRATORY (INHALATION) AS NEEDED
OUTPATIENT
Start: 2024-10-16

## 2024-10-09 RX ORDER — DIPHENHYDRAMINE HYDROCHLORIDE 50 MG/ML
50 INJECTION INTRAMUSCULAR; INTRAVENOUS AS NEEDED
OUTPATIENT
Start: 2024-10-16

## 2024-10-09 RX ORDER — HEPARIN 100 UNIT/ML
500 SYRINGE INTRAVENOUS AS NEEDED
OUTPATIENT
Start: 2024-10-16

## 2024-10-09 NOTE — PROGRESS NOTES
"[facility]    PATIENT VISIT INFORMATION    Visit Type: Follow up   I performed this visit using real-time telehealth tools, including an audio/video connection between (Cassie Roberts at her home) and (Henna Greco, JESUS at [facility])  Patient consents to telemedicine service today and understands the limitations of the visit, no physical examination and all issues may not be able to be addressed today, other than brief neuro and psych assessment.   Referring Provider: Gordo Leblanc DO  Reason for referral: Abnormal white count   Primary Practice Provider: Gordo Leblanc DO    HEMATOLOGY HISTORY    38-year-old female presents for evaluation of abnormal white cell count.  Referred by her primary care physician.    Leukopenia noted from 2 years ago, but she recall it transiently over the last 20 years, starting in college. Anemic during pregnancy.  Per medical record available the patient's WBCs ranged from 3.1-4.0.  No other notable abnormalities on CBC with differential.    Patient states diagnosis of Sjogren's disease by rheumatology in [place], potentially lupus, POTS, and in [place] 8 to 10 years ago Lyme disease.     HISTORY OF PRESENT ILLNESS     ID Statement: Harper Roberts \"Cassie\" is a 39-year-old female    Chief Complaint: \"Hanging in\"    Interval History:   She presents for follow up.  Multiple sinus infections and kidney infections in the last 4 month. More nausea. Joint pain. Follows specialist. Fatigue and loss of energy she feels in baseline.   She reports Appetite good, no weight loss recently. Lost weight and low appetite right after birth of her daughter 2 years ago. Concerned about hypoglycemia. Will follow PCP.     She notes a flare up a couple months. Chest pain for years, shooting pain across her rib cage over the last 10 years, SOB daily exertional and at rest occasionally, joint pain all over (feet, hands, neck, lower back, knees locking " "up), trigger finger and elbow pain, received cortisone injection Fall 2023.     \"Migraines most of my life with aura and blurry vision.\" About 15 migraines a month. Sometimes ocular migraines.     Nose bleeds on an off, not worse, improved.    Notable rashes off and on, started 10 years ago. Pityriasis rosacea diagnosed.  Locations include chest, face, arm. No itch. Gets worse with a hot shower.  Always fatigue, tiered, loss of energy. Not able to nap due to work and children.  Reports a flush feeling. Temp 99F at times. Hot and cold fluctuations. Cold hands and feet all the time. Floaters with eyes. Worse with flare. Followed optometrist regularly and ophthalmologist 6 years ago. Optic nerve drusen diagnosed at age 12 years old.     IBS symptoms. EGD and colonoscopy 7 years ago blood in stool and in urine noted at that time. Dull pain in back left side and sharp pain under left rib with deep breathes over the last 10 years. Left ache on lower left abdomen. Swollen hands in morning, unable to wear wedding band. Better in afternoon.    Heavy periods 10 days on and 10 days off and spotting in between. Bleeding light every day since IUD placed two months ago. Biopsy performed. Lesions benign. Cycles improved.     Tinnitus 6 months ago. Numbness and tingling hands and feet sometimes face for 10 years off and on. Left breast biopsy 10 years ago diagnosed as fibroadenoma.     Hx of drenching night sweats before and after birth of daughter. Not any in the last 6 months. Oral sores 5-10 location and description as \"lumps on inner cheek and roof of mouth swollen.\" Sore throats daily, seasonal allergie, no other symptoms when they occur.   Recent sinus infection resolved 2 weeks ago.     Diagnosed with Lyme's Disease. Minocycline a couple months at a time. Follows a specialist out of [place].     Denies any lymphadenopathy or consistent neuropathy.  Sometimes urgent loose stool (IBS), Nausea ongoing as baseline, low blood " sugar which helps with nausea. No constipation, urinating ok. No blood in urine now.     History of US of kidney and inflammation noted.     PAST/CURRENT HISTORY     MEDICAL/SURGICAL HISTORY  -Leukopenia  -Molar miscarriage for 10-11 weeks   -Preeclampsia both pregnancies   -Emergency  with one pregnancy  -Second pregnancy placenta previa    -Sjogrens   -POTS  -Lupus?   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   -Asthma 10 years ago on inhalers   -Covid 2024 mild  -possible kidney stone   -Hypoglycemia   -Migraine headaches with aura  -Anemia in pregnancy  -Candidiasis of vagina  -Hypertension  -Lyme's disease    SOCIAL HISTORY  -Lives with  and two kids (2 years and 9 years old)  -Work place:      -Tobacco/smokeless use: denies   -Alcohol: on occasion a couple drinks a month   -Illicit drug or marijuana use: denies   -Hinduism or Spiritual beliefs: Humanistic   -Social Determinates of Health Concerns: none reported     FAMILY HISTORY  -Mom living 68 years old Rosacea, breast cancer stage 2 HER+ diagnosis at 58 years, lumpectomy and radiation, kidney stones   -Maternal cousin brain tumor  at age 35 years old (many health issues)  -Maternal female cousin autoimmune disease unknown types  -Maternal Uncle end stages of colon stages 74 years old, hx autoimmune and EBV   -Maternal cousin female other autoimmune undiagnosed   -Paternal Uncle 50 years old diagnosed with stage 4 throat cancer non-smoker, living   -Paternal Uncle aortic aneurysm, covid prior  70 years old  -Paternal Uncle  MI (some life style risk factors)   -No other known history of hematologic, bleeding, clotting, autoimmune, genetic, or malignant disorders in the family.     OCCUPATIONAL/ENVIRONMENTAL HISTORY/EXPOSURES:  -None reported    Active Problems, Allergy List, Medication List, and PMH/PSH/FH/Social Hx have been reviewed and reconciled in chart. Updates made when  necessary.     REVIEW OF SYSTEMS   A review of systems has been completed and are negative for complaints except what is stated in the assessment, HPI, IH, ROS, and/or past medical history.    ALLERGIES AND MEDICATIONS     Allergies and Intolerances:   Allergies   Allergen Reactions    Cefaclor Rash    Doxycycline Rash      Medication Profile:   Current Outpatient Medications   Medication Instructions    levonorgestreL (Liletta) 20.4 mcg/24 hrs (8 yrs) 52 mg intrauterine device intrauterine, Once    rimegepant (NURTEC ODT) 75 mg, oral, As needed      Available Vaccination Record:   Immunization History   Administered Date(s) Administered    Flu vaccine (IIV4), preservative free *Check age/dose* 11/07/2022, 10/12/2023    Hepatitis B vaccine, adult *Check Product/Dose* 01/25/2013, 02/22/2013, 08/02/2013    Influenza, Unspecified 10/01/2014    Pfizer Purple Cap SARS-CoV-2 04/10/2021, 05/01/2021, 01/20/2022    Tdap vaccine, age 7 year and older (BOOSTRIX, ADACEL) 06/12/2015, 07/11/2022      PHYSICAL EXAM     Vital Signs/Measurements:       12/15/2023    10:27 AM 3/11/2024     1:43 PM 4/12/2024    10:22 AM 5/24/2024    11:51 AM 6/13/2024     9:08 AM 8/12/2024    11:09 AM 9/27/2024     6:46 PM   Vitals   Systolic 126 110 116 98 135 117 119   Diastolic 80 72 80 60 87 71 84   Heart Rate 94    65  70   Temp     36.6 °C (97.9 °F)  36.7 °C (98 °F)   Resp     16  18   Weight (lb) 114.2 118 116 116 117.51 117    BMI 20.89 kg/m2 21.58 kg/m2 21.22 kg/m2 21.22 kg/m2 21.49 kg/m2 21.4 kg/m2    BSA (m2) 1.51 m2 1.53 m2 1.52 m2 1.52 m2 1.53 m2 1.52 m2    Visit Report Report Report  Report Report Report Report        Performance:   ECOG Performance Status: 0     Grade ECOG performance status   0 Fully active, able to carry on all pre-disease performance without restriction   1 Restricted in physically strenuous activity but ambulatory and able to carry out work of a light or sedentary nature, e.g., light housework, office work   2  "Ambulatory and capable of all selfcare but unable to carry out any work activities; Up and about more than 50% of waking hours   3 Capable of only limited selfcare, confined to bed or chair more than 50% of waking hours   4 Completely disabled; Cannot carry out any selfcare; Totally confined to bed or chair   5 Dead     Physical Exam:  General: Patient is awake/alert/oriented x3, no distress   Psychological: Intact recent and remote memory, judgement, and insight. Appropriate mood, affect, and behavior     RESULTS/DATA     Labs:   Lab Results   Component Value Date    WBC 4.9 09/27/2024    NEUTROABS 3.09 09/27/2024    IGABSOL 0.01 09/27/2024    LYMPHSABS 1.12 (L) 09/27/2024    MONOSABS 0.59 09/27/2024    EOSABS 0.04 09/27/2024    BASOSABS 0.03 09/27/2024    RBC 4.42 09/27/2024    MCV 94 09/27/2024    MCHC 33.3 09/27/2024    HGB 13.8 09/27/2024    HCT 41.5 09/27/2024     09/27/2024     Lab Results   Component Value Date    RETICCTPCT 1.0 06/13/2024      Lab Results   Component Value Date    CREATININE 0.77 09/27/2024    BUN 13 09/27/2024    EGFR >90 09/27/2024     09/27/2024    K 3.9 09/27/2024     09/27/2024    CO2 29 09/27/2024      Lab Results   Component Value Date    ALT 9 09/27/2024    AST 13 09/27/2024    ALKPHOS 43 09/27/2024    BILITOT 0.6 09/27/2024      Lab Results   Component Value Date    TSH 2.21 03/08/2024     Lab Results   Component Value Date    TSH 2.21 03/08/2024     Lab Results   Component Value Date    IRON 51 09/27/2024    TIBC 288 09/27/2024    FERRITIN 33 09/27/2024      Lab Results   Component Value Date    ATSHNPDN63 307 09/27/2024        Lab Results   Component Value Date    LANE Negative 06/13/2024    RF <10 09/24/2024    SEDRATE 17 06/13/2024      Lab Results   Component Value Date    CRP <0.10 06/13/2024      No results found for: \"COURTNEY\"  Lab Results   Component Value Date     08/26/2022        Radiology/Studies:   US abdomen complete " 6/20/2024  IMPRESSION:  Unremarkable abdominal ultrasound  Incidental bilateral ovarian cysts or follicles as described above.     ASSESSMENT/PLAN     Assessment and Plan:   #1. Leukopenia   38-year-old female presents for evaluation of abnormal white cell count.  Referred by her primary care physician.    Leukopenia noted from 2 years ago, but she recall it transiently over the last 20 years, starting in college. Anemic during pregnancy.  Per medical record available the patient's WBCs ranged from 3.1-4.0.  No other notable abnormalities on CBC with differential.    Patient states diagnosis of Sjogren's disease by rheumatology in [place], potentially lupus, POTS, and in [place] 8 to 10 years ago Lyme disease.     Discussed some of the potential causes of leukopenia including nutritional deficiency, autoimmune, inflammatory or illness, bone marrow dysplasia or genetics.     Recent EBV infections. PCR negative. Mono sometime in recent past.  LANE and inflammatory markers negative.  Hepatitis B, C and HIV negative. US abdomen unremarkable.   Notably borderline B12 and iron.  Tsat 25% and ferritin of 23.  She will start a B12 supplement take daily to every other day.  B12 1000 mg.  She was started on low-dose iron with vitamin C. We will reassess in 3 months. Rheum consult may not be necessary at this time.     10/9/2024 ELBERT noted and lowe B12 levels.  She will take B12 daily she will purchase this over-the-counter.  IV iron ordered.  Hypoglycemia noted on her test 12 days ago without fasting.  She will follow primary.  CBC unremarkable.  Ultrasound unremarkable.    **Please follow with specialties as scheduled for other comorbidities and routine health screenings.**    I have reviewed the patient's medical record including provider notes, laboratory and testing results, imaging, and procedures available within the system and outside the system pertinent to patient care.     Follow up:    RTC:  -2 months with  labs    Medications:  -N/A    Imaging/Testing:  -NA    Referral:  -N/A    Other Pertinent Appointments:  -Genetics 11/7/2024 due to family history of breast cancer and colon cancer      Patient Discussion Summary:  Discussed plan of care in detail. Patient states understanding and in agreement. Answered all questions. They will call with any additional questions and/or concerns.    Thank you for allowing me to participate in your care.     Sincerely,  Henna Greco, APRN-CNP       This document may have been written by voice recognition software.  There may be some incorrect wording, spelling and/or spelling errors or punctuation errors that were not corrected prior to committing the note to the medical record. Please request clarification if there is documentation error or clarification is needed.   Time based billing: Please see documentation within this specific encounter.

## 2024-10-15 DIAGNOSIS — E61.1 IRON DEFICIENCY: Primary | ICD-10-CM

## 2024-10-15 RX ORDER — DIPHENHYDRAMINE HYDROCHLORIDE 50 MG/ML
50 INJECTION INTRAMUSCULAR; INTRAVENOUS AS NEEDED
Status: CANCELLED | OUTPATIENT
Start: 2024-10-17

## 2024-10-15 RX ORDER — EPINEPHRINE 0.3 MG/.3ML
0.3 INJECTION SUBCUTANEOUS EVERY 5 MIN PRN
Status: CANCELLED | OUTPATIENT
Start: 2024-10-17

## 2024-10-15 RX ORDER — ALBUTEROL SULFATE 0.83 MG/ML
3 SOLUTION RESPIRATORY (INHALATION) AS NEEDED
Status: CANCELLED | OUTPATIENT
Start: 2024-10-17

## 2024-10-15 RX ORDER — FAMOTIDINE 10 MG/ML
20 INJECTION INTRAVENOUS ONCE AS NEEDED
Status: CANCELLED | OUTPATIENT
Start: 2024-10-17

## 2024-10-17 ENCOUNTER — INFUSION (OUTPATIENT)
Dept: HEMATOLOGY/ONCOLOGY | Facility: CLINIC | Age: 39
End: 2024-10-17
Payer: COMMERCIAL

## 2024-10-17 VITALS
HEIGHT: 63 IN | WEIGHT: 119.49 LBS | OXYGEN SATURATION: 100 % | BODY MASS INDEX: 21.17 KG/M2 | DIASTOLIC BLOOD PRESSURE: 83 MMHG | HEART RATE: 63 BPM | SYSTOLIC BLOOD PRESSURE: 125 MMHG | RESPIRATION RATE: 16 BRPM | TEMPERATURE: 97.7 F

## 2024-10-17 DIAGNOSIS — E61.1 IRON DEFICIENCY: ICD-10-CM

## 2024-10-17 LAB
ERYTHROCYTE [DISTWIDTH] IN BLOOD BY AUTOMATED COUNT: 12.3 % (ref 11.5–14.5)
FERRITIN SERPL-MCNC: 38 NG/ML (ref 8–150)
HCT VFR BLD AUTO: 42.4 % (ref 36–46)
HGB BLD-MCNC: 14.1 G/DL (ref 12–16)
IRON SATN MFR SERPL: 39 % (ref 25–45)
IRON SERPL-MCNC: 121 UG/DL (ref 35–150)
MCH RBC QN AUTO: 31 PG (ref 26–34)
MCHC RBC AUTO-ENTMCNC: 33.3 G/DL (ref 32–36)
MCV RBC AUTO: 93 FL (ref 80–100)
PLATELET # BLD AUTO: 179 X10*3/UL (ref 150–450)
RBC # BLD AUTO: 4.55 X10*6/UL (ref 4–5.2)
TIBC SERPL-MCNC: 307 UG/DL (ref 240–445)
UIBC SERPL-MCNC: 186 UG/DL (ref 110–370)
WBC # BLD AUTO: 6.6 X10*3/UL (ref 4.4–11.3)

## 2024-10-17 PROCEDURE — 83540 ASSAY OF IRON: CPT

## 2024-10-17 PROCEDURE — 96374 THER/PROPH/DIAG INJ IV PUSH: CPT | Mod: INF

## 2024-10-17 PROCEDURE — 2500000004 HC RX 250 GENERAL PHARMACY W/ HCPCS (ALT 636 FOR OP/ED)

## 2024-10-17 PROCEDURE — 85027 COMPLETE CBC AUTOMATED: CPT

## 2024-10-17 PROCEDURE — 82728 ASSAY OF FERRITIN: CPT

## 2024-10-17 RX ORDER — ALBUTEROL SULFATE 0.83 MG/ML
3 SOLUTION RESPIRATORY (INHALATION) AS NEEDED
Status: DISCONTINUED | OUTPATIENT
Start: 2024-10-17 | End: 2024-10-17 | Stop reason: HOSPADM

## 2024-10-17 RX ORDER — DIPHENHYDRAMINE HYDROCHLORIDE 50 MG/ML
50 INJECTION INTRAMUSCULAR; INTRAVENOUS AS NEEDED
Status: DISCONTINUED | OUTPATIENT
Start: 2024-10-17 | End: 2024-10-17 | Stop reason: HOSPADM

## 2024-10-17 RX ORDER — HEPARIN SODIUM,PORCINE/PF 10 UNIT/ML
50 SYRINGE (ML) INTRAVENOUS AS NEEDED
OUTPATIENT
Start: 2024-10-17

## 2024-10-17 RX ORDER — HEPARIN 100 UNIT/ML
500 SYRINGE INTRAVENOUS AS NEEDED
Status: DISCONTINUED | OUTPATIENT
Start: 2024-10-17 | End: 2024-10-17 | Stop reason: HOSPADM

## 2024-10-17 RX ORDER — HEPARIN SODIUM,PORCINE/PF 10 UNIT/ML
50 SYRINGE (ML) INTRAVENOUS AS NEEDED
Status: DISCONTINUED | OUTPATIENT
Start: 2024-10-17 | End: 2024-10-17 | Stop reason: HOSPADM

## 2024-10-17 RX ORDER — FAMOTIDINE 10 MG/ML
20 INJECTION INTRAVENOUS ONCE AS NEEDED
OUTPATIENT
Start: 2024-10-21

## 2024-10-17 RX ORDER — EPINEPHRINE 0.3 MG/.3ML
0.3 INJECTION SUBCUTANEOUS EVERY 5 MIN PRN
OUTPATIENT
Start: 2024-10-21

## 2024-10-17 RX ORDER — FAMOTIDINE 10 MG/ML
20 INJECTION INTRAVENOUS ONCE AS NEEDED
Status: DISCONTINUED | OUTPATIENT
Start: 2024-10-17 | End: 2024-10-17 | Stop reason: HOSPADM

## 2024-10-17 RX ORDER — HEPARIN 100 UNIT/ML
500 SYRINGE INTRAVENOUS AS NEEDED
OUTPATIENT
Start: 2024-10-17

## 2024-10-17 RX ORDER — DIPHENHYDRAMINE HYDROCHLORIDE 50 MG/ML
50 INJECTION INTRAMUSCULAR; INTRAVENOUS AS NEEDED
OUTPATIENT
Start: 2024-10-21

## 2024-10-17 RX ORDER — ALBUTEROL SULFATE 0.83 MG/ML
3 SOLUTION RESPIRATORY (INHALATION) AS NEEDED
OUTPATIENT
Start: 2024-10-21

## 2024-10-17 RX ORDER — EPINEPHRINE 0.3 MG/.3ML
0.3 INJECTION SUBCUTANEOUS EVERY 5 MIN PRN
Status: DISCONTINUED | OUTPATIENT
Start: 2024-10-17 | End: 2024-10-17 | Stop reason: HOSPADM

## 2024-10-17 ASSESSMENT — PAIN SCALES - GENERAL: PAINLEVEL_OUTOF10: 0-NO PAIN

## 2024-10-17 NOTE — PROGRESS NOTES
"1504: Completed Iron IVP. Patient stated feeling well. VSS.   1535: VSS. Patient stated she \"still was feeling well\".   Patient recieved first dose of Venofer IVP with no s/s of reaction or complication. Gave and reviewed schedule with patient. Patient left infusion independently in stable condition.    "

## 2024-10-21 ENCOUNTER — APPOINTMENT (OUTPATIENT)
Dept: HEMATOLOGY/ONCOLOGY | Facility: CLINIC | Age: 39
End: 2024-10-21
Payer: COMMERCIAL

## 2024-10-25 ENCOUNTER — INFUSION (OUTPATIENT)
Dept: HEMATOLOGY/ONCOLOGY | Facility: CLINIC | Age: 39
End: 2024-10-25
Payer: COMMERCIAL

## 2024-10-25 VITALS
TEMPERATURE: 97.3 F | DIASTOLIC BLOOD PRESSURE: 88 MMHG | WEIGHT: 119.93 LBS | RESPIRATION RATE: 16 BRPM | HEART RATE: 80 BPM | SYSTOLIC BLOOD PRESSURE: 129 MMHG | OXYGEN SATURATION: 98 % | BODY MASS INDEX: 21.57 KG/M2

## 2024-10-25 DIAGNOSIS — E61.1 IRON DEFICIENCY: ICD-10-CM

## 2024-10-25 PROCEDURE — 2500000004 HC RX 250 GENERAL PHARMACY W/ HCPCS (ALT 636 FOR OP/ED)

## 2024-10-25 PROCEDURE — 96374 THER/PROPH/DIAG INJ IV PUSH: CPT | Mod: INF

## 2024-10-25 RX ORDER — DIPHENHYDRAMINE HYDROCHLORIDE 50 MG/ML
50 INJECTION INTRAMUSCULAR; INTRAVENOUS AS NEEDED
OUTPATIENT
Start: 2024-10-29

## 2024-10-25 RX ORDER — EPINEPHRINE 0.3 MG/.3ML
0.3 INJECTION SUBCUTANEOUS EVERY 5 MIN PRN
OUTPATIENT
Start: 2024-10-29

## 2024-10-25 RX ORDER — ALBUTEROL SULFATE 0.83 MG/ML
3 SOLUTION RESPIRATORY (INHALATION) AS NEEDED
Status: DISCONTINUED | OUTPATIENT
Start: 2024-10-25 | End: 2024-10-25 | Stop reason: HOSPADM

## 2024-10-25 RX ORDER — HEPARIN SODIUM,PORCINE/PF 10 UNIT/ML
50 SYRINGE (ML) INTRAVENOUS AS NEEDED
OUTPATIENT
Start: 2024-10-25

## 2024-10-25 RX ORDER — ALBUTEROL SULFATE 0.83 MG/ML
3 SOLUTION RESPIRATORY (INHALATION) AS NEEDED
OUTPATIENT
Start: 2024-10-29

## 2024-10-25 RX ORDER — FAMOTIDINE 10 MG/ML
20 INJECTION INTRAVENOUS ONCE AS NEEDED
OUTPATIENT
Start: 2024-10-29

## 2024-10-25 RX ORDER — FAMOTIDINE 10 MG/ML
20 INJECTION INTRAVENOUS ONCE AS NEEDED
Status: DISCONTINUED | OUTPATIENT
Start: 2024-10-25 | End: 2024-10-25 | Stop reason: HOSPADM

## 2024-10-25 RX ORDER — DIPHENHYDRAMINE HYDROCHLORIDE 50 MG/ML
50 INJECTION INTRAMUSCULAR; INTRAVENOUS AS NEEDED
Status: DISCONTINUED | OUTPATIENT
Start: 2024-10-25 | End: 2024-10-25 | Stop reason: HOSPADM

## 2024-10-25 RX ORDER — HEPARIN 100 UNIT/ML
500 SYRINGE INTRAVENOUS AS NEEDED
OUTPATIENT
Start: 2024-10-25

## 2024-10-25 RX ORDER — EPINEPHRINE 0.3 MG/.3ML
0.3 INJECTION SUBCUTANEOUS EVERY 5 MIN PRN
Status: DISCONTINUED | OUTPATIENT
Start: 2024-10-25 | End: 2024-10-25 | Stop reason: HOSPADM

## 2024-10-25 ASSESSMENT — PAIN SCALES - GENERAL: PAINLEVEL_OUTOF10: 0-NO PAIN

## 2024-10-29 ENCOUNTER — INFUSION (OUTPATIENT)
Dept: HEMATOLOGY/ONCOLOGY | Facility: CLINIC | Age: 39
End: 2024-10-29
Payer: COMMERCIAL

## 2024-10-29 VITALS
BODY MASS INDEX: 21.61 KG/M2 | WEIGHT: 120.15 LBS | OXYGEN SATURATION: 98 % | HEART RATE: 78 BPM | DIASTOLIC BLOOD PRESSURE: 87 MMHG | RESPIRATION RATE: 16 BRPM | TEMPERATURE: 97.5 F | SYSTOLIC BLOOD PRESSURE: 137 MMHG

## 2024-10-29 DIAGNOSIS — E61.1 IRON DEFICIENCY: ICD-10-CM

## 2024-10-29 PROCEDURE — 2500000004 HC RX 250 GENERAL PHARMACY W/ HCPCS (ALT 636 FOR OP/ED)

## 2024-10-29 PROCEDURE — 96374 THER/PROPH/DIAG INJ IV PUSH: CPT | Mod: INF

## 2024-10-29 RX ORDER — ALBUTEROL SULFATE 0.83 MG/ML
3 SOLUTION RESPIRATORY (INHALATION) AS NEEDED
OUTPATIENT
Start: 2024-11-04

## 2024-10-29 RX ORDER — DIPHENHYDRAMINE HYDROCHLORIDE 50 MG/ML
50 INJECTION INTRAMUSCULAR; INTRAVENOUS AS NEEDED
OUTPATIENT
Start: 2024-11-04

## 2024-10-29 RX ORDER — HEPARIN SODIUM,PORCINE/PF 10 UNIT/ML
50 SYRINGE (ML) INTRAVENOUS AS NEEDED
OUTPATIENT
Start: 2024-10-29

## 2024-10-29 RX ORDER — EPINEPHRINE 0.3 MG/.3ML
0.3 INJECTION SUBCUTANEOUS EVERY 5 MIN PRN
OUTPATIENT
Start: 2024-11-04

## 2024-10-29 RX ORDER — FAMOTIDINE 10 MG/ML
20 INJECTION INTRAVENOUS ONCE AS NEEDED
OUTPATIENT
Start: 2024-11-04

## 2024-10-29 RX ORDER — HEPARIN 100 UNIT/ML
500 SYRINGE INTRAVENOUS AS NEEDED
OUTPATIENT
Start: 2024-10-29

## 2024-10-29 ASSESSMENT — PAIN SCALES - GENERAL: PAINLEVEL_OUTOF10: 2

## 2024-11-07 ENCOUNTER — APPOINTMENT (OUTPATIENT)
Dept: GENETICS | Facility: CLINIC | Age: 39
End: 2024-11-07
Payer: COMMERCIAL

## 2024-11-14 ENCOUNTER — TELEMEDICINE CLINICAL SUPPORT (OUTPATIENT)
Dept: GENETICS | Facility: CLINIC | Age: 39
End: 2024-11-14
Payer: COMMERCIAL

## 2024-11-14 DIAGNOSIS — Z13.71 ENCOUNTER FOR NONPROCREATIVE GENETIC COUNSELING AND TESTING: Primary | ICD-10-CM

## 2024-11-14 DIAGNOSIS — D72.9 ABNORMAL WHITE BLOOD CELL COUNT: ICD-10-CM

## 2024-11-14 DIAGNOSIS — Z80.3 FAMILY HISTORY OF BREAST CANCER: ICD-10-CM

## 2024-11-14 DIAGNOSIS — D70.9 NEUTROPENIA, UNSPECIFIED TYPE (CMS-HCC): ICD-10-CM

## 2024-11-14 DIAGNOSIS — Z71.83 ENCOUNTER FOR NONPROCREATIVE GENETIC COUNSELING AND TESTING: Primary | ICD-10-CM

## 2024-11-14 DIAGNOSIS — Z80.42 FAMILY HX OF PROSTATE CANCER: ICD-10-CM

## 2024-11-14 PROCEDURE — 98968 PH1 ASSMT&MGMT NQHP 21-30: CPT | Performed by: GENETIC COUNSELOR, MS

## 2024-11-14 NOTE — PROGRESS NOTES
"History of Present Illness:  Harper Roberts \"Jose Carlos" is a 39 y.o. female with a family history of cancer. She was referred to the Cancer Genetics Clinic at [facility] by Henna Greco APRN-*. Ms. Roberts is interested in genetic testing to clarify their personal risk for cancer, as well as the risks to their family members.    Cancer Medical History:  Personal history of cancer? No, but history of partial molar pregnancy.    Other health issues: History of chronic leukopenia. Also reports history of Sjogren's disease, potentially lupus, POTS, past dx of Lyme disease, migraines.    Prior hereditary cancer (germline) genetic testing? No.    Cancer screening history:  Mammograms? Yes, several. Last in 2019.  Breast MRI? Yes, x1 (2020).  Breast biopsy? Yes, x1 in --has biopsy proven fibroadenoma.  PAP smear? Yes, per gyn provider. Remote history of abnormal Pap smear.  Colonoscopy? Yes, x1 in 2018 due to rectal bleeding. Reports no history of colon polyps.  Upper endoscopy? Yes, x1 circa .  Dermatology? No.  Other cancer screening? No.    Reproductive History:  Number of children: 2.  Number of pregnancies: 3.  Age first birth: 29.  Breast feeding? Yes, for about 4 months in total.  Menarche (age): 16.  Menopause (age): N/A.  OCP: Yes, for 3-5 years.  HRT: N/A.    Hysterectomy? No.  Oophorectomy? No.    Family history:  A 4-generation pedigree was obtained and was significant for the following:    -Patient, history of leukopenia in the setting of autoimmune disease;  -Mother, stage II Her2+ breast cancer at age 59, living;  -Maternal grandfather, metastatic prostate cancer, skin cancer,  in his 80s;  -Maternal grandmother, concern for gynecologic cancer due to vaginal bleeding at the time of death in her late 80s;  -Maternal uncle, prostate cancer at 73, alive at 74;  -Maternal 1st cousin, brain tumor,  at age 35;  -Father, non-melanoma skin cancer, alive at 70;  -Paternal uncle, stage IV " throat cancer at 50, alive at 65.    Maternal ancestry is Malaysian/Polish.  Paternal ancestry is Italian/English/Romanian. There is no known Ashkenazi Rastafari ancestry or consanguinity.    Genetic counseling:  Ms. Roberts is a 39 y.o. female with a history of leukopenia, as well as a family history of breast and prostate cancer concerning for possible hereditary cancer. The breast and prostate cancer in the family could be due to BRCA1 or BRCA2-related hereditary breast and ovarian cancer (HBOC), or hereditary cancer due to a different gene mutation, such as in PALB2.  Ms. Roberts meets current national (NCCN) criteria for testing of high-penetrance breast cancer susceptibility genes, including BRCA1, BRCA2, CDH1, PALB2, PTEN, and TP53.  Testing is medically necessary, as it will help determine if Ms. Roberts is a candidate for high-risk breast care & risk-reducing BSO (having the ovaries and fallopian tubes removed to prevent ovarian cancer).    We reviewed genes and chromosomes, inherited forms of breast and ovarian cancer, and the BRCA1 and BRCA2 genes causing HBOC.  We discussed that most cancers are not due to an inherited genetic susceptibility.  However, in about 5-10% of families, there is an inherited genetic mutation that can make a person more susceptible to developing certain forms of cancer.  Within these families, we often see multiple family members with cancer, occurring in multiple generations.  In addition, earlier onset and bilateral cancers are suggestive of an inherited form of cancer.  Finally, there is a clustering of certain types of cancer in these families, such as breast and ovarian cancer..    We discussed the BRCA1 and BRCA2 genes, which are two genes that have been linked to early-onset breast and/or ovarian cancer.  Mutations in these genes are inherited in a dominant pattern and confer up to an 87% lifetime risk for breast cancer.  This is elevated compared to the general population risk of  10-12%.  In addition, BRCA1 and BRCA2 mutation carriers have up to a 45% lifetime risk for ovarian cancer, which is elevated over the 2% general population risk.  Mutation carriers who have already been diagnosed with cancer have an increased risk to develop a second, contralateral breast cancer.  BRCA2 gene mutation carriers have an increased risk for male breast cancer, prostate cancer, melanoma, gastric cancer, and pancreatic cancer.    We discussed that there are multiple genes associated with increased breast and/or ovarian cancer risk. Some genes, like the BRCA genes are considered highly penetrant breast and ovarian cancer genes, meaning a mutation in the gene confers a high risk of breast and/or ovarian cancer. On the other hand, there are other intermediate (moderate risk) breast and ovarian cancer genes. For many of the moderate risk genes, there is sometimes limited information regarding the degree to which a mutation in the gene affects risk of different types of cancers. Additionally, for some of these moderate risk genes, the appropriate management for individuals who have a mutation in one of these genes is not always clear. In many cases, even if an individual tests positive for a mutation in a moderate risk gene, recommendations are still based on the family history, not the positive test result.    Ms. Roberts was counseled about hereditary cancer susceptibility including cancer risks, options for increased screening and/or risk reduction, genetic testing (including positive, negative, and uncertain results), and the implications for other family members.  We discussed performing testing for high-penetrance breast cancer susceptibility genes, ideally as part of a multi-gene panel.  We discussed both smaller and larger multigene panels. Given her history of leukopenia, we also discussed that she could consider testing for hereditary causes of hematologic cancers (such as DDX41), for which leukopenia  can sometimes be a presenting sign. However, it is also possible that Ms. Roberts's history of leukopenia could in part be mediated by autoimmune disease.    Ms. Roberts ultimately expressed the greatest interest in the larger [facility] CancerNext-Expanded panel, which examines the following 76 genes, including some genes associated with hereditary hematologic cancers, such as DDX41:    AIP, ALK, APC, RALPH, AXIN2, BAP1, BARD1, BMPR1A, BRCA1, BRCA2, BRIP1, CDC73, CDH1, CDK4, CDKN1B, CDKN2A, CEBPA, CHEK2, CTNNA1, DDX41, DICER1, EGFR, EPCAM, ETV6, FH, FLCN, GATA2, GREM1, HOXB13, KIT, LZTR1, MAX, MBD4, MEN1, MET, MITF, MLH1, MSH2, MSH3, MSH6, MUTYH, NF1, NF2, NTHL1, PALB2, PDGFRA, PHOX2B, PMS2, POLD1, POLE, POT1, XIYIK4S, PTCH1, PTEN, RAD51C, RAD51D, RB1, RET, RUNX1, SDHA, SDHAF2, SDHB, SDHC, SDHD, SMAD4, SMARCA4, SMARCB1, SMARCE1, STK11, SUFU, JPZG422, TP53, TSC1, TSC2, VHL, WT1    We discussed the Genetic Information Nondiscrimination Act (VANESSA). We discussed the protections and limitations of VANESSA. VANESSA generally makes in illegal for health insurance companies, group health plans, and most employers (with >15 employees) to discriminate based on genetic information. It does not protect against genetic discrimination for life insurance, disability insurance, long-term care, or other insurances.    After a discussion about the risks, benefits, and limitations of genetic testing,  Ms. Roberts elected to undergo genetic testing for hereditary cancer using the [facility] panel described above. Oral consent for testing was obtained. She elected the self-pay ($249) option.    An [facility] DNA/RNA blood test kit will be sent to Ms. Roberts's home. They will then bring the test kit to a  outpatient blood draw lab to have their blood drawn for testing (using the test tubes provided in the kit). The sample will then be sent to [facility] for analysis.     Results are typically available in 3 weeks from the time of blood draw. Ms. Roberts will  return to the Cancer Genetics Clinic to discuss their testing results.  At that time, we will make recommendations for both Ms. Roberts and her family members in terms of cancer screening and/or cancer risk reduction options.    Plan;  1. Ms. Roberts elected to undergo genetic testing for hereditary cancer using the [facility] panel described above. Oral consent for testing was obtained. She elected the self-pay ($249) option. An [facility] DNA/RNA blood test kit will be sent to Ms. Roberts's home. They will then bring the test kit to a  outpatient blood draw lab to have their blood drawn for testing (using the test tubes provided in the kit). The sample will then be sent to [facility] for analysis.     2. Results are typically available in 3 weeks from the time of blood draw. Ms. Roberts will return to the Cancer Genetics Clinic to discuss their testing results. A follow up appointment has been scheduled for Monday 12/16 at 2 pm, virtual visit.    3. We remain available to Ms. Roberts at [phone] if any questions arise regarding information discussed at today's visit. Cristina can be reached directly at [phone].    Cristina Asif MS, [facility]  Genetic Counselor  [facility]  [phone]    Time spent with patient:  52 minutes (9:04-9:56 am), virtual (phone) visit.    Virtual or Telephone Consent    A telephone visit (audio only) between the patient (at the originating site) and the provider (at the distant site) was utilized to provide this telehealth service.   Verbal consent was requested and obtained from Harper Roberts on this date, 11/14/24 for a telehealth visit.

## 2024-11-20 ENCOUNTER — APPOINTMENT (OUTPATIENT)
Dept: GENETICS | Facility: CLINIC | Age: 39
End: 2024-11-20
Payer: COMMERCIAL

## 2024-11-20 ENCOUNTER — LAB (OUTPATIENT)
Dept: LAB | Facility: LAB | Age: 39
End: 2024-11-20
Payer: COMMERCIAL

## 2024-11-20 DIAGNOSIS — D70.9 NEUTROPENIA, UNSPECIFIED TYPE (CMS-HCC): ICD-10-CM

## 2024-11-20 DIAGNOSIS — Z80.3 FAMILY HISTORY OF BREAST CANCER: ICD-10-CM

## 2024-11-20 DIAGNOSIS — Z80.42 FAMILY HX OF PROSTATE CANCER: ICD-10-CM

## 2024-11-20 DIAGNOSIS — D72.9 ABNORMAL WHITE BLOOD CELL COUNT: ICD-10-CM

## 2024-12-01 ENCOUNTER — OFFICE VISIT (OUTPATIENT)
Dept: URGENT CARE | Age: 39
End: 2024-12-01
Payer: COMMERCIAL

## 2024-12-01 VITALS
SYSTOLIC BLOOD PRESSURE: 125 MMHG | OXYGEN SATURATION: 99 % | TEMPERATURE: 98.4 F | DIASTOLIC BLOOD PRESSURE: 71 MMHG | HEART RATE: 83 BPM

## 2024-12-01 DIAGNOSIS — Z87.09 HISTORY OF ASTHMA: ICD-10-CM

## 2024-12-01 DIAGNOSIS — J20.9 ACUTE BRONCHITIS, UNSPECIFIED ORGANISM: Primary | ICD-10-CM

## 2024-12-01 RX ORDER — METHYLPREDNISOLONE 4 MG/1
TABLET ORAL
Qty: 21 TABLET | Refills: 0 | Status: SHIPPED | OUTPATIENT
Start: 2024-12-01 | End: 2024-12-07

## 2024-12-01 RX ORDER — ALBUTEROL SULFATE 90 UG/1
2 INHALANT RESPIRATORY (INHALATION) EVERY 4 HOURS PRN
Qty: 6.7 G | Refills: 0 | Status: SHIPPED | OUTPATIENT
Start: 2024-12-01 | End: 2025-12-01

## 2024-12-01 RX ORDER — AZITHROMYCIN 250 MG/1
TABLET, FILM COATED ORAL
Qty: 6 TABLET | Refills: 0 | Status: SHIPPED | OUTPATIENT
Start: 2024-12-01 | End: 2024-12-06

## 2024-12-01 ASSESSMENT — ENCOUNTER SYMPTOMS
FEVER: 0
COUGH: 1
WHEEZING: 1

## 2024-12-01 NOTE — PROGRESS NOTES
"Subjective   Patient ID: Harper Roberts \"Cassie\" is a 39 y.o. female. They present today with a chief complaint of Cough (x1week).    History of Present Illness  Patient has had a cough for the last 1-1/2 weeks.  Recently she has started producing sputum and has noticed over the last day or so that she is wheezing.  She has a past medical history of asthma      History provided by:  Patient   used: No    Cough  Associated symptoms include wheezing. Pertinent negatives include no fever.       Past Medical History  Allergies as of 12/01/2024 - Reviewed 12/01/2024   Allergen Reaction Noted    Cefaclor Rash 03/25/2010    Doxycycline Rash 09/27/2024       (Not in a hospital admission)       Past Medical History:   Diagnosis Date    11 weeks gestation of pregnancy ([facility]) 02/18/2022    11 weeks gestation of pregnancy    15 weeks gestation of pregnancy ([facility]) 04/04/2022    15 weeks gestation of pregnancy    20 weeks gestation of pregnancy ([facility]) 05/05/2022    20 weeks gestation of pregnancy    23 weeks gestation of pregnancy ([facility])     23 weeks gestation of pregnancy    26 weeks gestation of pregnancy ([facility]) 05/16/2022    26 weeks gestation of pregnancy    27 weeks gestation of pregnancy ([facility]) 06/10/2022    27 weeks gestation of pregnancy    28 weeks gestation of pregnancy ([facility]) 06/20/2022    28 weeks gestation of pregnancy    31 weeks gestation of pregnancy ([facility]) 07/11/2022    31 weeks gestation of pregnancy    33 weeks gestation of pregnancy ([facility]) 07/25/2022    33 weeks gestation of pregnancy    36 weeks gestation of pregnancy ([facility]) 08/09/2022    36 weeks gestation of pregnancy    37 weeks gestation of pregnancy ([facility]) 08/22/2022    37 weeks gestation of pregnancy    38 weeks gestation of pregnancy ([facility]) 04/03/2024    Abnormal findings on diagnostic imaging of other specified body structures 08/09/2021    Thickened endometrium    Abnormal uterine bleeding " 2024    Acquired trigger finger of left middle finger 2024    Acute posthemorrhagic anemia 2022    Acute vaginitis 2021    Bacterial vaginosis    Acute vaginitis 2022    Acute vaginitis    Breast pain 2024    Breech presentation with  problem ([facility]) 2024    Congestion of paranasal sinus 2024    Dizziness 2024    Dry eyes, bilateral 2017    Encounter for follow-up examination after completed treatment for conditions other than malignant neoplasm 2021    Postop check    Encounter for gynecological examination (general) (routine) without abnormal findings 02/10/2021    Women's annual routine gynecological examination    Encounter for screening for human papillomavirus (HPV) 02/10/2021    Screening for HPV (human papillomavirus)    Encounter for screening for infections with a predominantly sexual mode of transmission 04/15/2021    Screening for STD (sexually transmitted disease)    Encounter for screening for malignant neoplasm of cervix 02/10/2021    Screening for cervical cancer    Encounter for supervision of other normal pregnancy, first trimester 2022    Encounter for supervision of normal pregnancy in multigravida in first trimester    Encounter for supervision of other normal pregnancy, first trimester 2021    Multigravida in first trimester    Encounter for supervision of other normal pregnancy, second trimester 2022    Encounter for supervision of normal pregnancy in multigravida in second trimester    Encounter for supervision of other normal pregnancy, third trimester 2022    Encounter for supervision of normal pregnancy in multigravida in third trimester    Gestational hypertension, third trimester ([facility]) 2024    Hypertrophy of uterus 2021    Enlarged uterus    Inconclusive mammogram 10/27/2020    Dense breast tissue    Irritable bowel syndrome without diarrhea 2022    Low lying  placenta nos or without hemorrhage, unspecified trimester ([facility]) 2022    Low-lying placenta without hemorrhage    Maternal care for unspecified type scar from previous  delivery ([facility]) 04/15/2021    Uterine scar from previous  delivery complicating pregnancy    Other complications following an ectopic and molar pregnancy 2021    Partial molar pregnancy    Other conditions influencing health status 2021    Patient requests second opinion    Other conditions influencing health status 01/10/2022    History of pregnancy    Other conditions influencing health status     History of pregnancy    Other specified pregnancy related conditions, unspecified trimester ([facility]) 2022    Cramping affecting pregnancy, antepartum    Other specified symptoms and signs involving the digestive system and abdomen 2021    Abdominal fullness    Pain of foot 2024    Pelvic and perineal pain 2021    Pelvic pain in female    Personal history of malignant neoplasm of breast 2019    History of breast cancer in female    Personal history of other benign neoplasm 10/27/2020    History of other benign neoplasm    Personal history of other complications of pregnancy, childbirth and the puerperium 04/15/2021    History of pregnancy induced hypertension    Personal history of other diseases of the female genital tract 05/10/2021    History of vaginal discharge    Personal history of other diseases of the female genital tract 2021    History of abnormal uterine bleeding    Personal history of other infectious and parasitic diseases 2021    History of candidiasis of vagina    Postoperative vaginal bleeding following genitourinary procedure 2024    Pregnancy with inconclusive fetal viability, not applicable or unspecified 04/15/2021    Encounter to determine fetal viability of pregnancy    Pregnancy with inconclusive fetal viability, not applicable or unspecified  2021    Pregnancy with inconclusive fetal viability    Pregnancy-induced hypertension, delivered ([facility]) 2024    Secondary amenorrhea 01/10/2022    Secondary amenorrhea    Severe pre-eclampsia, complicating the puerperium ([facility]) 2022    Spotting complicating pregnancy, third trimester ([facility]) 06/10/2022    Spotting affecting pregnancy in third trimester    Vomiting of pregnancy, unspecified 04/15/2021    Nausea and vomiting in pregnancy       Past Surgical History:   Procedure Laterality Date    OTHER SURGICAL HISTORY  2019    Hernia repair    OTHER SURGICAL HISTORY  2019     section    OTHER SURGICAL HISTORY  10/03/2022    Breast biopsy core needle    OTHER SURGICAL HISTORY  2021    Dilation and curettage        reports that she has never smoked. She has never used smokeless tobacco. She reports current alcohol use. She reports that she does not use drugs.    Review of Systems  Review of Systems   Constitutional:  Negative for fever.   HENT:  Positive for congestion.    Respiratory:  Positive for cough and wheezing.                                   Objective    Vitals:    24 1204   BP: 125/71   Pulse: 83   Temp: 36.9 °C (98.4 °F)   SpO2: 99%     No LMP recorded.    Physical Exam  Vitals and nursing note reviewed.   Constitutional:       Appearance: Normal appearance. She is normal weight. She is not ill-appearing or toxic-appearing.      Comments: Very pleasant cooperative 39-year-old female in no acute distress   HENT:      Head: Normocephalic and atraumatic.      Right Ear: Tympanic membrane, ear canal and external ear normal.      Left Ear: Tympanic membrane, ear canal and external ear normal.      Nose: Congestion present.      Comments: Minimal nasal congestion     Mouth/Throat:      Mouth: Mucous membranes are moist.      Pharynx: Oropharynx is clear.   Eyes:      Extraocular Movements: Extraocular movements intact.      Conjunctiva/sclera:  Conjunctivae normal.      Pupils: Pupils are equal, round, and reactive to light.   Cardiovascular:      Rate and Rhythm: Normal rate and regular rhythm.   Pulmonary:      Effort: Pulmonary effort is normal. No respiratory distress.      Breath sounds: Normal breath sounds.   Musculoskeletal:      Cervical back: Normal range of motion and neck supple.   Lymphadenopathy:      Cervical: No cervical adenopathy.   Neurological:      General: No focal deficit present.      Mental Status: She is alert and oriented to person, place, and time.   Psychiatric:         Mood and Affect: Mood normal.         Behavior: Behavior normal.         Procedures    Point of Care Test & Imaging Results from this visit  No results found for this visit on 12/01/24.   No results found.    Diagnostic study results (if any) were reviewed by Eladia Junior PA-C.    Assessment/Plan   Allergies, medications, history, and pertinent labs/EKGs/Imaging reviewed by Eladia Junior PA-C.     Medical Decision Making  History and physical exam are consistent with bronchitis.  She has an occasional bronchospastic cough during exam although she has no wheezing.  Will treat with Zithromax to cover her for the atypical respiratory infections that are prevalent in this area.  Will also prescribe steroids and an inhaler.  She is currently using Tessalon Perles but feels they are not especially helpful.  If her symptoms worsen especially chest pain, shortness of breath, malaise high fevers weakness etc. she is encouraged to go to the emergency department for a more thorough workup at a higher level of care    Orders and Diagnoses  There are no diagnoses linked to this encounter.    Medical Admin Record      Patient disposition: Home    Electronically signed by Eladia Junior PA-C  12:45 PM

## 2024-12-06 ENCOUNTER — LAB (OUTPATIENT)
Dept: LAB | Facility: LAB | Age: 39
End: 2024-12-06
Payer: COMMERCIAL

## 2024-12-06 DIAGNOSIS — D50.8 IRON DEFICIENCY ANEMIA SECONDARY TO INADEQUATE DIETARY IRON INTAKE: ICD-10-CM

## 2024-12-06 DIAGNOSIS — E16.2 HYPOGLYCEMIA: ICD-10-CM

## 2024-12-06 DIAGNOSIS — E61.1 IRON DEFICIENCY: ICD-10-CM

## 2024-12-06 DIAGNOSIS — D72.9 ABNORMAL WHITE BLOOD CELL COUNT: ICD-10-CM

## 2024-12-06 LAB
ALBUMIN SERPL BCP-MCNC: 4.5 G/DL (ref 3.4–5)
ALP SERPL-CCNC: 50 U/L (ref 33–110)
ALT SERPL W P-5'-P-CCNC: 11 U/L (ref 7–45)
ANION GAP SERPL CALC-SCNC: 10 MMOL/L (ref 10–20)
AST SERPL W P-5'-P-CCNC: 13 U/L (ref 9–39)
BASOPHILS # BLD AUTO: 0.06 X10*3/UL (ref 0–0.1)
BASOPHILS NFR BLD AUTO: 1.1 %
BILIRUB SERPL-MCNC: 0.7 MG/DL (ref 0–1.2)
BUN SERPL-MCNC: 18 MG/DL (ref 6–23)
CALCIUM SERPL-MCNC: 9.6 MG/DL (ref 8.6–10.3)
CHLORIDE SERPL-SCNC: 104 MMOL/L (ref 98–107)
CO2 SERPL-SCNC: 28 MMOL/L (ref 21–32)
CREAT SERPL-MCNC: 0.73 MG/DL (ref 0.5–1.05)
EGFRCR SERPLBLD CKD-EPI 2021: >90 ML/MIN/1.73M*2
EOSINOPHIL # BLD AUTO: 0.06 X10*3/UL (ref 0–0.7)
EOSINOPHIL NFR BLD AUTO: 1.1 %
ERYTHROCYTE [DISTWIDTH] IN BLOOD BY AUTOMATED COUNT: 12.9 % (ref 11.5–14.5)
FERRITIN SERPL-MCNC: 175 NG/ML (ref 8–150)
GLUCOSE SERPL-MCNC: 71 MG/DL (ref 74–99)
HCT VFR BLD AUTO: 39.4 % (ref 36–46)
HGB BLD-MCNC: 14.3 G/DL (ref 12–16)
IMM GRANULOCYTES # BLD AUTO: 0.02 X10*3/UL (ref 0–0.7)
IMM GRANULOCYTES NFR BLD AUTO: 0.4 % (ref 0–0.9)
IRON SATN MFR SERPL: 52 % (ref 25–45)
IRON SERPL-MCNC: 138 UG/DL (ref 35–150)
LYMPHOCYTES # BLD AUTO: 1.9 X10*3/UL (ref 1.2–4.8)
LYMPHOCYTES NFR BLD AUTO: 34.4 %
MCH RBC QN AUTO: 34.7 PG (ref 26–34)
MCHC RBC AUTO-ENTMCNC: 36.3 G/DL (ref 32–36)
MCV RBC AUTO: 96 FL (ref 80–100)
MONOCYTES # BLD AUTO: 0.49 X10*3/UL (ref 0.1–1)
MONOCYTES NFR BLD AUTO: 8.9 %
NEUTROPHILS # BLD AUTO: 3 X10*3/UL (ref 1.2–7.7)
NEUTROPHILS NFR BLD AUTO: 54.1 %
NRBC BLD-RTO: 0 /100 WBCS (ref 0–0)
PLATELET # BLD AUTO: 231 X10*3/UL (ref 150–450)
POTASSIUM SERPL-SCNC: 3.1 MMOL/L (ref 3.5–5.3)
PROT SERPL-MCNC: 7 G/DL (ref 6.4–8.2)
RBC # BLD AUTO: 4.12 X10*6/UL (ref 4–5.2)
SODIUM SERPL-SCNC: 139 MMOL/L (ref 136–145)
TIBC SERPL-MCNC: 266 UG/DL (ref 240–445)
UIBC SERPL-MCNC: 128 UG/DL (ref 110–370)
VIT B12 SERPL-MCNC: 416 PG/ML (ref 211–911)
WBC # BLD AUTO: 5.5 X10*3/UL (ref 4.4–11.3)

## 2024-12-06 PROCEDURE — 36415 COLL VENOUS BLD VENIPUNCTURE: CPT

## 2024-12-06 PROCEDURE — 83540 ASSAY OF IRON: CPT

## 2024-12-06 PROCEDURE — 82607 VITAMIN B-12: CPT

## 2024-12-06 PROCEDURE — 85025 COMPLETE CBC W/AUTO DIFF WBC: CPT

## 2024-12-06 PROCEDURE — 80053 COMPREHEN METABOLIC PANEL: CPT

## 2024-12-06 PROCEDURE — 82728 ASSAY OF FERRITIN: CPT

## 2024-12-06 PROCEDURE — 83550 IRON BINDING TEST: CPT

## 2024-12-11 ENCOUNTER — TELEMEDICINE (OUTPATIENT)
Dept: HEMATOLOGY/ONCOLOGY | Facility: CLINIC | Age: 39
End: 2024-12-11
Payer: COMMERCIAL

## 2024-12-11 ENCOUNTER — LAB (OUTPATIENT)
Dept: LAB | Facility: LAB | Age: 39
End: 2024-12-11
Payer: COMMERCIAL

## 2024-12-11 DIAGNOSIS — R06.2 WHEEZING: ICD-10-CM

## 2024-12-11 DIAGNOSIS — E61.1 IRON DEFICIENCY: ICD-10-CM

## 2024-12-11 DIAGNOSIS — R06.02 SOB (SHORTNESS OF BREATH): ICD-10-CM

## 2024-12-11 DIAGNOSIS — R06.02 SOB (SHORTNESS OF BREATH): Primary | ICD-10-CM

## 2024-12-11 DIAGNOSIS — R05.8 PRODUCTIVE COUGH: ICD-10-CM

## 2024-12-11 DIAGNOSIS — R10.9 LEFT SIDED ABDOMINAL PAIN: ICD-10-CM

## 2024-12-11 DIAGNOSIS — J98.8 CONGESTION OF UPPER AIRWAY: ICD-10-CM

## 2024-12-11 DIAGNOSIS — D50.8 IRON DEFICIENCY ANEMIA SECONDARY TO INADEQUATE DIETARY IRON INTAKE: ICD-10-CM

## 2024-12-11 DIAGNOSIS — R10.9 ABDOMINAL PAIN, UNSPECIFIED ABDOMINAL LOCATION: ICD-10-CM

## 2024-12-11 DIAGNOSIS — E16.2 HYPOGLYCEMIA: ICD-10-CM

## 2024-12-11 DIAGNOSIS — D72.9 ABNORMAL WHITE BLOOD CELL COUNT: ICD-10-CM

## 2024-12-11 DIAGNOSIS — D70.9 NEUTROPENIA, UNSPECIFIED TYPE (CMS-HCC): ICD-10-CM

## 2024-12-11 DIAGNOSIS — D72.9 ABNORMAL WHITE BLOOD CELL COUNT: Primary | ICD-10-CM

## 2024-12-11 LAB
ALBUMIN SERPL BCP-MCNC: 4.4 G/DL (ref 3.4–5)
ALP SERPL-CCNC: 48 U/L (ref 33–110)
ALT SERPL W P-5'-P-CCNC: 13 U/L (ref 7–45)
ANION GAP SERPL CALC-SCNC: 9 MMOL/L (ref 10–20)
AST SERPL W P-5'-P-CCNC: 13 U/L (ref 9–39)
BILIRUB SERPL-MCNC: 1.1 MG/DL (ref 0–1.2)
BUN SERPL-MCNC: 16 MG/DL (ref 6–23)
CALCIUM SERPL-MCNC: 9.4 MG/DL (ref 8.6–10.3)
CHLORIDE SERPL-SCNC: 102 MMOL/L (ref 98–107)
CO2 SERPL-SCNC: 29 MMOL/L (ref 21–32)
COMMENTS - MP RESULT TYPE: NORMAL
CREAT SERPL-MCNC: 0.71 MG/DL (ref 0.5–1.05)
EGFRCR SERPLBLD CKD-EPI 2021: >90 ML/MIN/1.73M*2
GLUCOSE SERPL-MCNC: 81 MG/DL (ref 74–99)
POTASSIUM SERPL-SCNC: 3.5 MMOL/L (ref 3.5–5.3)
PROT SERPL-MCNC: 7 G/DL (ref 6.4–8.2)
SCAN RESULT: NORMAL
SODIUM SERPL-SCNC: 136 MMOL/L (ref 136–145)

## 2024-12-11 PROCEDURE — 99214 OFFICE O/P EST MOD 30 MIN: CPT

## 2024-12-11 PROCEDURE — 36415 COLL VENOUS BLD VENIPUNCTURE: CPT

## 2024-12-11 PROCEDURE — 80053 COMPREHEN METABOLIC PANEL: CPT

## 2024-12-11 NOTE — PROGRESS NOTES
"[facility]    PATIENT VISIT INFORMATION    Visit Type: Follow up   I performed this visit using real-time telehealth tools, including an audio/video connection between (Cassie Roberts at her home) and (Henna Greco, JESUS at [facility])  Patient consents to telemedicine service today and understands the limitations of the visit, no physical examination and all issues may not be able to be addressed today, other than brief neuro and psych assessment.   Referring Provider: Gordo Leblanc DO  Reason for referral: Abnormal white count   Primary Practice Provider: Gordo Leblanc DO    HEMATOLOGY HISTORY    38-year-old female presents for evaluation of abnormal white cell count.  Referred by her primary care physician.    Leukopenia noted from 2 years ago, but she recall it transiently over the last 20 years, starting in college. Anemic during pregnancy.  Per medical record available the patient's WBCs ranged from 3.1-4.0.  No other notable abnormalities on CBC with differential.    Patient states diagnosis of Sjogren's disease by rheumatology in [place], potentially lupus, POTS, and in [place] 8 to 10 years ago Lyme disease.     Iron deficiency, last iron infusion 11/4/2024, Venofer 3/3.    HISTORY OF PRESENT ILLNESS     ID Statement: Harper Roberts \"Cassie\" is a 39-year-old female    Chief Complaint: \"Been sick a lot the last 6 months\"    Interval History:   She presents for follow up.  She has been ill and persistent cough and chest infection.  Went to the urgent care.  Diagnosed with pneumonia.  Reports that she has finished antibiotic a week ago and was on prednisone.  She feels the congestion has gotten worse.  She is more short of breath.  She notes nausea and not eating as well.  Drenching night sweats.  She is not able to get into her primary care.    Multiple sinus infections and kidney infections in the last 6 month. More nausea. Joint pain.  She reports " "namely respiratory infections, total of 7 infections in last 6 months.  Follows specialist. Fatigue and loss of energy she feels in baseline.   She reports Appetite good, no weight loss recently. Lost weight and low appetite right after birth of her daughter 2 years ago. Concerned about hypoglycemia. Will follow PCP.     She notes a flare up a couple months. Chest pain for years, shooting pain across her rib cage over the last 10 years, SOB daily exertional and at rest occasionally, joint pain all over (feet, hands, neck, lower back, knees locking up), trigger finger and elbow pain, received cortisone injection Fall 2023.     \"Migraines most of my life with aura and blurry vision.\" About 15 migraines a month. Sometimes ocular migraines.     Nose bleeds on an off, not worse, improved.    Notable rashes off and on, started 10 years ago. Pityriasis rosacea diagnosed.  Locations include chest, face, arm. No itch. Gets worse with a hot shower.  Always fatigue, tiered, loss of energy. Not able to nap due to work and children.  Reports a flush feeling. Temp 99F at times. Hot and cold fluctuations. Cold hands and feet all the time. Floaters with eyes. Worse with flare. Followed optometrist regularly and ophthalmologist 6 years ago. Optic nerve drusen diagnosed at age 12 years old.     IBS symptoms. EGD and colonoscopy 7 years ago blood in stool and in urine noted at that time. Dull pain in back left side and sharp pain under left rib with deep breathes over the last 10 years. Left ache on lower left abdomen. Swollen hands in morning, unable to wear wedding band. Better in afternoon.    Heavy periods 10 days on and 10 days off and spotting in between. Bleeding light every day since IUD placed two months ago. Biopsy performed. Lesions benign. Cycles improved.     Tinnitus 6 months ago. Numbness and tingling hands and feet sometimes face for 10 years off and on. Left breast biopsy 10 years ago diagnosed as fibroadenoma.     Hx " "of drenching night sweats before and after birth of daughter. Not any in the last 6 months. Oral sores 5-10 location and description as \"lumps on inner cheek and roof of mouth swollen.\" Sore throats daily, seasonal allergie, no other symptoms when they occur.   Recent sinus infection resolved 2 weeks ago.     Diagnosed with Lyme's Disease. Minocycline a couple months at a time. Follows a specialist out of [place].     Denies any lymphadenopathy or consistent neuropathy.  Sometimes urgent loose stool (IBS), Nausea ongoing as baseline, low blood sugar which helps with nausea. No constipation, urinating ok. No blood in urine now.     History of US of kidney and inflammation noted.     PAST/CURRENT HISTORY     MEDICAL/SURGICAL HISTORY  -Leukopenia  -Molar miscarriage for 10-11 weeks   -Preeclampsia both pregnancies   -Emergency  with one pregnancy  -Second pregnancy placenta previa    -Sjogrens   -POTS  -Lupus?   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   -Asthma 10 years ago on inhalers   -Covid 2024 mild  -possible kidney stone   -Hypoglycemia   -Migraine headaches with aura  -Anemia in pregnancy  -Candidiasis of vagina  -Hypertension  -Lyme's disease    SOCIAL HISTORY  -Lives with  and two kids (2 years and 9 years old)  -Work place:      -Tobacco/smokeless use: denies   -Alcohol: on occasion a couple drinks a month   -Illicit drug or marijuana use: denies   -Episcopal or Spiritual beliefs: Humanistic   -Social Determinates of Health Concerns: none reported     FAMILY HISTORY  -Mom living 68 years old Rosacea, breast cancer stage 2 HER+ diagnosis at 58 years, lumpectomy and radiation, kidney stones   -Maternal cousin brain tumor  at age 35 years old (many health issues)  -Maternal female cousin autoimmune disease unknown types  -Maternal Uncle end stages of colon stages 74 years old, hx autoimmune and EBV   -Maternal cousin female other autoimmune " undiagnosed   -Paternal Uncle 50 years old diagnosed with stage 4 throat cancer non-smoker, living   -Paternal Uncle aortic aneurysm, covid prior  70 years old  -Paternal Uncle  MI (some life style risk factors)   -No other known history of hematologic, bleeding, clotting, autoimmune, genetic, or malignant disorders in the family.     OCCUPATIONAL/ENVIRONMENTAL HISTORY/EXPOSURES:  -None reported    Active Problems, Allergy List, Medication List, and PMH/PSH/FH/Social Hx have been reviewed and reconciled in chart. Updates made when necessary.     REVIEW OF SYSTEMS   A review of systems has been completed and are negative for complaints except what is stated in the assessment, HPI, IH, ROS, and/or past medical history.    ALLERGIES AND MEDICATIONS     Allergies and Intolerances:   Allergies   Allergen Reactions    Cefaclor Rash    Doxycycline Rash      Medication Profile:   Current Outpatient Medications   Medication Instructions    albuterol (Proventil HFA) 90 mcg/actuation inhaler 2 puffs, inhalation, Every 4 hours PRN    levonorgestreL (Liletta) 20.4 mcg/24 hrs (8 yrs) 52 mg intrauterine device Once    rimegepant (NURTEC ODT) 75 mg, oral, As needed      Available Vaccination Record:   Immunization History   Administered Date(s) Administered    COVID-19, mRNA, LNP-S, PF, 30 mcg/0.3 mL dose 04/10/2021, 2021, 2022    Flu vaccine (IIV4), preservative free *Check age/dose* 2022, 10/12/2023    Hepatitis B vaccine, adult *Check Product/Dose* 2013, 2013, 2013    Influenza, Unspecified 10/01/2014    Tdap vaccine, age 7 year and older (BOOSTRIX, ADACEL) 2015, 2022      PHYSICAL EXAM     Vital Signs/Measurements:       10/17/2024     2:29 PM 10/17/2024     3:00 PM 10/17/2024     3:31 PM 10/25/2024     2:36 PM 10/29/2024    11:35 AM 10/29/2024     1:21 PM 2024    12:04 PM   Vitals   Systolic 124 128 125 129 138 137 125   Diastolic 90 83 83 88 88 87 71   BP  "Location Right arm   Right arm Right arm     Heart Rate 74 57 63 80 78  83   Temp 36.5 °C (97.7 °F)   36.3 °C (97.3 °F) 36.4 °C (97.5 °F)  36.9 °C (98.4 °F)   Resp 16 16 16 16 16     Height 1.588 m (5' 2.52\")          Weight (lb) 119.49   119.93 120.15     BMI 21.49 kg/m2   21.57 kg/m2 21.61 kg/m2     BSA (m2) 1.55 m2   1.55 m2 1.55 m2     Visit Report       Report       Significant value        Performance:   ECOG Performance Status: 0     Grade ECOG performance status   0 Fully active, able to carry on all pre-disease performance without restriction   1 Restricted in physically strenuous activity but ambulatory and able to carry out work of a light or sedentary nature, e.g., light housework, office work   2 Ambulatory and capable of all selfcare but unable to carry out any work activities; Up and about more than 50% of waking hours   3 Capable of only limited selfcare, confined to bed or chair more than 50% of waking hours   4 Completely disabled; Cannot carry out any selfcare; Totally confined to bed or chair   5 Dead     Physical Exam:  General: Patient is awake/alert/oriented x3, no distress, ill-appearing, pallor   Psychological: Intact recent and remote memory, judgement, and insight. Appropriate mood, affect, and behavior     RESULTS/DATA     Labs:   Lab Results   Component Value Date    WBC 5.5 12/06/2024    NEUTROABS 3.00 12/06/2024    IGABSOL 0.02 12/06/2024    LYMPHSABS 1.90 12/06/2024    MONOSABS 0.49 12/06/2024    EOSABS 0.06 12/06/2024    BASOSABS 0.06 12/06/2024    RBC 4.12 12/06/2024    MCV 96 12/06/2024    MCHC 36.3 (H) 12/06/2024    HGB 14.3 12/06/2024    HCT 39.4 12/06/2024     12/06/2024     Lab Results   Component Value Date    RETICCTPCT 1.0 06/13/2024      Lab Results   Component Value Date    CREATININE 0.73 12/06/2024    BUN 18 12/06/2024    EGFR >90 12/06/2024     12/06/2024    K 3.1 (L) 12/06/2024     12/06/2024    CO2 28 12/06/2024      Lab Results   Component Value " "Date    ALT 11 12/06/2024    AST 13 12/06/2024    ALKPHOS 50 12/06/2024    BILITOT 0.7 12/06/2024      Lab Results   Component Value Date    TSH 2.21 03/08/2024     Lab Results   Component Value Date    TSH 2.21 03/08/2024     Lab Results   Component Value Date    IRON 138 12/06/2024    TIBC 266 12/06/2024    FERRITIN 175 (H) 12/06/2024      Lab Results   Component Value Date    IFNFFTAV44 416 12/06/2024        Lab Results   Component Value Date    LANE Negative 06/13/2024    RF <10 09/24/2024    SEDRATE 17 06/13/2024      Lab Results   Component Value Date    CRP <0.10 06/13/2024      No results found for: \"COURTNEY\"  Lab Results   Component Value Date     08/26/2022        Radiology/Studies:   US abdomen complete 6/20/2024  IMPRESSION:  Unremarkable abdominal ultrasound  Incidental bilateral ovarian cysts or follicles as described above.     ASSESSMENT/PLAN     Assessment and Plan:   #1. Leukopenia   #2. Iron deficiency  39-year-old female presents for evaluation of abnormal white cell count.  Referred by her primary care physician.    Leukopenia noted from 2 years ago, but she recall it transiently over the last 20 years, starting in college. Anemic during pregnancy.  Per medical record available the patient's WBCs ranged from 3.1-4.0.  No other notable abnormalities on CBC with differential.    Patient states diagnosis of Sjogren's disease by rheumatology in [place], potentially lupus, POTS, and in [place] 8 to 10 years ago Lyme disease.     Discussed some of the potential causes of leukopenia including nutritional deficiency, autoimmune, inflammatory or illness, bone marrow dysplasia or genetics.     Recent EBV infections. PCR negative. Mono sometime in recent past.  LANE and inflammatory markers negative.  Hepatitis B, C and HIV negative. US abdomen unremarkable.   Notably borderline B12 and iron.  Tsat 25% and ferritin of 23.  She will start a B12 supplement take daily to every other day.  B12 1000 " mg.  She was started on low-dose iron with vitamin C. We will reassess in 3 months. Rheum consult may not be necessary at this time.     10/9/2024 ELBERT noted and lowe B12 levels.  She will take B12 daily she will purchase this over-the-counter.  IV iron ordered.  Hypoglycemia noted on her test 12 days ago without fasting.  She will follow primary.  CBC unremarkable.  Ultrasound unremarkable.    12/11/2024: Iron deficiency with the most recent iron infusions completed November 4, 2024.  She reports that she felt some improvement though still remains fatigued.  She thinks this may be partly due to recent illnesses.  Iron stores are repleted.  Leukopenia resolved since September 2024.  Uncertain if this is related to nutritional deficiency of iron versus autoimmune etiology.  We will reassess in 3 months.    Patient questing stat imaging.  CT of chest ordered for night sweats, shortness of breath, worsening congestion, and cough.  Scheduled for 12/13/2024.  Potassium slightly low at 3.1 during last chemistry assessment a week ago.  Repeat normal levels today, potassium of 3.5.  B12 levels improved.    She consulted with genetics and has a follow-up visit for results of genetic testing regarding: Breast cancer 12/16/2024.    **Please follow with specialties as scheduled for other comorbidities and routine health screenings.**    I have reviewed the patient's medical record including provider notes, laboratory and testing results, imaging, and procedures available within the system and outside the system pertinent to patient care.     Follow up:    RTC:  -3 months with labs  -Will call with results of CT scan    Medications:  -N/A    Imaging/Testing:  -CT chest due to respiratory illness    Referral:  -Primary care    Other Pertinent Appointments:  -Genetics 12/16/2024    Patient Discussion Summary:  Discussed plan of care in detail. Patient states understanding and in agreement. Answered all questions. They will call  with any additional questions and/or concerns.    Thank you for allowing me to participate in your care.     Sincerely,  Henna Greco, APRN-CNP       This document may have been written by voice recognition software.  There may be some incorrect wording, spelling and/or spelling errors or punctuation errors that were not corrected prior to committing the note to the medical record. Please request clarification if there is documentation error or clarification is needed.   Time based billing: Please see documentation within this specific encounter.

## 2024-12-13 ENCOUNTER — HOSPITAL ENCOUNTER (OUTPATIENT)
Dept: RADIOLOGY | Facility: CLINIC | Age: 39
Discharge: HOME | End: 2024-12-13
Payer: COMMERCIAL

## 2024-12-13 DIAGNOSIS — J18.9 PNEUMONIA DUE TO INFECTIOUS ORGANISM, UNSPECIFIED LATERALITY, UNSPECIFIED PART OF LUNG: ICD-10-CM

## 2024-12-13 DIAGNOSIS — J98.8 CONGESTION OF UPPER AIRWAY: ICD-10-CM

## 2024-12-13 DIAGNOSIS — R10.9 LEFT SIDED ABDOMINAL PAIN: ICD-10-CM

## 2024-12-13 DIAGNOSIS — R06.02 SOB (SHORTNESS OF BREATH): ICD-10-CM

## 2024-12-13 DIAGNOSIS — R06.2 WHEEZING: ICD-10-CM

## 2024-12-13 DIAGNOSIS — R05.8 PRODUCTIVE COUGH: ICD-10-CM

## 2024-12-13 DIAGNOSIS — D72.9 ABNORMAL WHITE BLOOD CELL COUNT: ICD-10-CM

## 2024-12-13 DIAGNOSIS — D70.9 NEUTROPENIA, UNSPECIFIED TYPE (CMS-HCC): ICD-10-CM

## 2024-12-13 DIAGNOSIS — R06.02 SOB (SHORTNESS OF BREATH): Primary | ICD-10-CM

## 2024-12-13 DIAGNOSIS — R10.9 ABDOMINAL PAIN, UNSPECIFIED ABDOMINAL LOCATION: ICD-10-CM

## 2024-12-13 PROCEDURE — 71260 CT THORAX DX C+: CPT

## 2024-12-13 PROCEDURE — 2550000001 HC RX 255 CONTRASTS

## 2024-12-13 RX ORDER — AMOXICILLIN AND CLAVULANATE POTASSIUM 875; 125 MG/1; MG/1
875 TABLET, FILM COATED ORAL 2 TIMES DAILY
Qty: 20 TABLET | Refills: 0 | Status: SHIPPED | OUTPATIENT
Start: 2024-12-13

## 2024-12-16 ENCOUNTER — TELEMEDICINE CLINICAL SUPPORT (OUTPATIENT)
Facility: CLINIC | Age: 39
End: 2024-12-16
Payer: COMMERCIAL

## 2024-12-16 DIAGNOSIS — Z80.42 FAMILY HX OF PROSTATE CANCER: ICD-10-CM

## 2024-12-16 DIAGNOSIS — Z13.71 BRCA NEGATIVE: ICD-10-CM

## 2024-12-16 DIAGNOSIS — Z80.3 FAMILY HISTORY OF BREAST CANCER: ICD-10-CM

## 2024-12-16 DIAGNOSIS — Z13.71 ENCOUNTER FOR NONPROCREATIVE GENETIC COUNSELING AND TESTING: ICD-10-CM

## 2024-12-16 DIAGNOSIS — Z91.89 INCREASED RISK OF BREAST CANCER: ICD-10-CM

## 2024-12-16 DIAGNOSIS — Z71.83 ENCOUNTER FOR NONPROCREATIVE GENETIC COUNSELING AND TESTING: ICD-10-CM

## 2024-12-16 PROCEDURE — 96040 HC GENETIC COUNSELING, EACH 30 MIN: CPT | Mod: 95 | Performed by: GENETIC COUNSELOR, MS

## 2024-12-16 PROCEDURE — 96040 PR MEDICAL GENETICS COUNSELING EACH 30 MINUTES: CPT | Performed by: GENETIC COUNSELOR, MS

## 2024-12-16 NOTE — PROGRESS NOTES
History of Present Illness:  Harper Roberts is a 39 y.o. female seen in virtual follow-up in the [facility]. She was last seen 2024, at which time she chose to pursue hereditary cancer testing due to her family history of cancer. Her results returned, and are NEGATIVE (normal). She has access to her results via her  [employer].    Family history (as previously noted):     -Patient, history of leukopenia in the setting of autoimmune disease;  -Mother, stage II Her2+ breast cancer at age 59, living;  -Maternal grandfather, metastatic prostate cancer, skin cancer,  in his 80s;  -Maternal grandmother, concern for gynecologic cancer due to vaginal bleeding at the time of death in her late 80s;  -Maternal uncle, prostate cancer at 73, alive at 74;  -Maternal 1st cousin, brain tumor,  at age 35;  -Father, non-melanoma skin cancer, alive at 70;  -Paternal uncle, stage IV throat cancer at 50, alive at 65.     Maternal ancestry is Surinamese/Polish.  Paternal ancestry is Czech/English/Bulgarian. There is no known Ashkenazi Judaism ancestry or consanguinity.    Genetic test results:  Negative 76-gene Triptrotting CancerNext-Expanded + RNAiEchoSign panel, report date 12/10/2024.    Genes Analyzed (76 total): AIP, ALK, APC, RALPH, BAP1, BARD1, BMPR1A, BRCA1, BRCA2, BRIP1, CDC73, CDH1, CDK4, CDKN1B, CDKN2A, CEBPA, CHEK2, DICER1, ETV6, FH, FLCN, GATA2, LZTR1, MAX, MEN1, MET, MLH1, MSH2, MSH6, MUTYH, NF1, NF2, NTHL1, PALB2, PHOX2B, PMS2, POT1, TBOII9U, PTCH1, PTEN, RAD51C, RAD51D, RB1, RET, RUNX1, SDHA, SDHAF2, SDHB, SDHC, SDHD, SMAD4, SMARCA4, SMARCB1, SMARCE1, STK11, SUFU, QSMG938, TP53, TSC1, TSC2, VHL and WT1 (sequencing and deletion/duplication); AXIN2, CTNNA1, DDX41, EGFR, HOXB13, KIT, MBD4, MITF, MSH3, PDGFRA, POLD1 and POLE (sequencing only); EPCAM and GREM1 (deletion/duplication only). RNA data is routinely analyzed for use in variant interpretation for all genes.    Results are summarized at the bottom of this  note, as well as attached to the encounter.    Genetic counseling:  Ms. Roberts's results were NEGATIVE, meaning that no disease causing mutations were identified. A genetic cause for her family history of cancer has not been identified.    One reason Ms. Roberts underwent genetic testing was to better understand her risk to develop breast cancer, to help determine if further breast screening or surgery is indicated. Because her results were negative, Ms. Roberts does not have an identifiable genetic risk factor that places her at an increased risk to develop breast cancer. However, she is still at increased risk to develop breast cancer based on her family. She also has extremely dense breast tissue.    The Tyrer-Cuzick/BANDAR model was used to estimate Ms. Roberts's lifetime risk for breast cancer. This estimate is 16.2% (versus general population risk of 11%). She is currently not a candidate for breast MRI, but it is important to note that her risk will change with age. When she is 42, her lifetime risk with the same parameters is 20%, at which point she would be a candidate for yearly breast MRI in addition to yearly mammograms. She was encouraged to have her lifetime risk of breast cancer re-evaluated by requesting a referral to the  High Risk Breast program when she turns 40s, as this will impact her care.    It was previously discussed that women with BRCA1 and BRCA2 mutations have an increased risk for ovarian cancer. With negative test results and no family history of ovarian cancer, Ms. Roberts is not considered at increased risk for this cancer type from a genetic standpoint. Prophylactic surgery is not indicated from a genetic standpoint.    We also discussed that Ms. Roberts should follow their primary care providers' recommendations for all other age-related cancer screenings, such as colonoscopies, etc.    Ms. Roberts's mother and other maternal relatives are still candidates for genetic testing themselves, as the  "family history of breast and prostate cancer remains unexplained.  There may be a gene mutation, like a BRCA1 or BRCA2 mutation, in the family that Ms. Roberts did not inherit. If her relatives are local, we would be glad to see them here at .  They can call [phone], option 1, to schedule an appointment.  If they live outside the [facility], their relatives can find a genetics specialist in their area by visiting the National Society for Genetic Counselors website at: <http://www.nsgc.org/> under \"Find a Genetic Counselor,\" with \"Cancer\" specified under special area.     A brief family history of Ms. Roberts children's father (her ) was obtained. Ms. Roberts shared that her children's paternal grandmother had both breast and lung cancer diagnosed in her 70s. We reviewed that this history is not really suggestive of hereditary cancer, therefore no further genetic testing is recommended for her  or their children.    Our understanding of genetic contribution to cancer diagnoses is always evolving, so there may be additional testing recommended in the future. Ms. Roberts was asked to keep us apprised as to any changes to their personal and/or family history of cancer, and to contact us in 2-3 years to determine if there have been any changes since our discussion today.    Cristina Asif MS, [facility] [place]  [facility]  [phone]    Time spent with patient:  (2:09-2:31 pm), virtual visit.    Virtual or Telephone Consent    An interactive audio and video telecommunication system which permits real time communications between the patient (at the originating site) and provider (at the distant site) was utilized to provide this telehealth service.   Verbal consent was requested and obtained from Harepr Roberts on this date, 12/16/24 for a telehealth visit.         "

## 2024-12-27 ENCOUNTER — APPOINTMENT (OUTPATIENT)
Dept: PRIMARY CARE | Facility: CLINIC | Age: 39
End: 2024-12-27
Payer: COMMERCIAL

## 2024-12-27 VITALS
DIASTOLIC BLOOD PRESSURE: 60 MMHG | SYSTOLIC BLOOD PRESSURE: 116 MMHG | HEART RATE: 85 BPM | OXYGEN SATURATION: 99 % | WEIGHT: 120.8 LBS | BODY MASS INDEX: 21.73 KG/M2

## 2024-12-27 DIAGNOSIS — Z23 FLU VACCINE NEED: ICD-10-CM

## 2024-12-27 DIAGNOSIS — Z00.00 HEALTHCARE MAINTENANCE: ICD-10-CM

## 2024-12-27 DIAGNOSIS — J18.9 PNEUMONIA OF RIGHT UPPER LOBE DUE TO INFECTIOUS ORGANISM: Primary | ICD-10-CM

## 2024-12-27 PROCEDURE — 90656 IIV3 VACC NO PRSV 0.5 ML IM: CPT | Performed by: STUDENT IN AN ORGANIZED HEALTH CARE EDUCATION/TRAINING PROGRAM

## 2024-12-27 PROCEDURE — 1036F TOBACCO NON-USER: CPT | Performed by: STUDENT IN AN ORGANIZED HEALTH CARE EDUCATION/TRAINING PROGRAM

## 2024-12-27 PROCEDURE — 3078F DIAST BP <80 MM HG: CPT | Performed by: STUDENT IN AN ORGANIZED HEALTH CARE EDUCATION/TRAINING PROGRAM

## 2024-12-27 PROCEDURE — 90471 IMMUNIZATION ADMIN: CPT | Performed by: STUDENT IN AN ORGANIZED HEALTH CARE EDUCATION/TRAINING PROGRAM

## 2024-12-27 PROCEDURE — 3074F SYST BP LT 130 MM HG: CPT | Performed by: STUDENT IN AN ORGANIZED HEALTH CARE EDUCATION/TRAINING PROGRAM

## 2024-12-27 PROCEDURE — 99214 OFFICE O/P EST MOD 30 MIN: CPT | Performed by: STUDENT IN AN ORGANIZED HEALTH CARE EDUCATION/TRAINING PROGRAM

## 2024-12-27 NOTE — PROGRESS NOTES
Subjective   Patient ID: Cassie Roberts is a 39 y.o. female who presents for Discuss Tests.    HPI   She is 39 years old female with past medical history of leukopenia, iron deficiency anemia s/p iron infusion in November 2024, Lyme disease, POTS, migraine who presented today to the office for follow-up of CT scan results.   Patient had chest congestion, cough, nausea and was diagnosed by urgent care with pneumonia.    She was treated with antibiotic, has Zithromycin and prednisone but her symptoms did not get better so she had another course of antibiotic , Augmentin.   CT scan was ordered by oncologist during her visit for work up for leukopenia.   CT scan showed multifocal clustered nodular opacities throughout the right  upper lobe with slight involvement of the right middle lobe  compatible with infectious or inflammatory bronchiolitis  Currently patient is feeling better, slightly cough but her symptoms got better 80%.   Patient report having 7 infection in the past 6 months.     She reports having low blood sugar in the past between 50 and 60.    She was not having symptoms during this time.     Review of Systems  .  All pertinent positive symptoms are included in the history of present illness.    All other systems have been reviewed and are negative and noncontributory to this patient's current ailments.  Objective   /60 (BP Location: Left arm, Patient Position: Sitting, BP Cuff Size: Adult)   Pulse 85   Wt 54.8 kg (120 lb 12.8 oz)   SpO2 99%   BMI 21.73 kg/m²     Physical Exam  General: Alert and oriented. Appears well-nourished and in no acute distress.  Eyes: PERRLA. EOMI.  Head/neck: Normocephalic. Supple.  Pharynx; no exudate present, no erythema.   Respiratory/Thorax: Clear to auscultation bilaterally. No wheezing.   Cardiovascular: Regular rate and rhythm. No murmurs.  Psychological: Appropriate mood and affect.   Skin: No visible rashes or lesions.    Assessment/Plan   Diagnoses and all  orders for this visit:  Pneumonia of right upper lobe due to infectious organism        -patient is feeling better.   -     CT chest wo IV contrast; Future  -Please do labs, and will follow up with results.   Flu vaccine need  -     Flu vaccine, trivalent, preservative free, age 6 months and greater (Fluraix/Fluzone/Flulaval).  -will consider prevnar with your history of recurrent infection.    Hypoglycemia  Glucose meter was provided for the patient today.   Will follow-up with the results    Follow up in 3 months for health maintenance.      I discussed my plan with my attending, Dr. Leblanc.     Erin White. MD  Family medicine resident  PGY3

## 2025-01-13 ENCOUNTER — APPOINTMENT (OUTPATIENT)
Dept: RADIOLOGY | Facility: CLINIC | Age: 40
End: 2025-01-13
Payer: COMMERCIAL

## 2025-01-14 ENCOUNTER — HOSPITAL ENCOUNTER (OUTPATIENT)
Dept: RADIOLOGY | Facility: CLINIC | Age: 40
Discharge: HOME | End: 2025-01-14
Payer: COMMERCIAL

## 2025-01-14 DIAGNOSIS — J18.9 PNEUMONIA OF RIGHT UPPER LOBE DUE TO INFECTIOUS ORGANISM: ICD-10-CM

## 2025-01-14 PROCEDURE — 71250 CT THORAX DX C-: CPT

## 2025-01-14 PROCEDURE — 71250 CT THORAX DX C-: CPT | Performed by: RADIOLOGY

## 2025-01-15 NOTE — RESULT ENCOUNTER NOTE
CT scan showed resolution of the previously reported infiltrate/pneumonia    There are continued small pulmonary nodules  These are all smaller than 6 mm in size so no further follow-up is necessary unless the patient has a high risk for lung cancer    We will discuss this further at her appointment next month

## 2025-01-16 ENCOUNTER — APPOINTMENT (OUTPATIENT)
Dept: RADIOLOGY | Facility: CLINIC | Age: 40
End: 2025-01-16
Payer: COMMERCIAL

## 2025-02-09 LAB
ALBUMIN SERPL-MCNC: 4.4 G/DL (ref 3.6–5.1)
ALP SERPL-CCNC: 38 U/L (ref 31–125)
ALT SERPL-CCNC: 13 U/L (ref 6–29)
ANION GAP SERPL CALCULATED.4IONS-SCNC: 8 MMOL/L (CALC) (ref 7–17)
AST SERPL-CCNC: 14 U/L (ref 10–30)
BILIRUB SERPL-MCNC: 0.8 MG/DL (ref 0.2–1.2)
BUN SERPL-MCNC: 15 MG/DL (ref 7–25)
CALCIUM SERPL-MCNC: 8.9 MG/DL (ref 8.6–10.2)
CHLORIDE SERPL-SCNC: 104 MMOL/L (ref 98–110)
CHOLEST SERPL-MCNC: 155 MG/DL
CHOLEST/HDLC SERPL: 2.1 (CALC)
CO2 SERPL-SCNC: 28 MMOL/L (ref 20–32)
CREAT SERPL-MCNC: 0.79 MG/DL (ref 0.5–0.97)
EGFRCR SERPLBLD CKD-EPI 2021: 99 ML/MIN/1.73M2
EST. AVERAGE GLUCOSE BLD GHB EST-MCNC: 97 MG/DL
EST. AVERAGE GLUCOSE BLD GHB EST-SCNC: 5.4 MMOL/L
GLUCOSE SERPL-MCNC: 60 MG/DL (ref 65–139)
HBA1C MFR BLD: 5 % OF TOTAL HGB
HDLC SERPL-MCNC: 73 MG/DL
LDLC SERPL CALC-MCNC: 66 MG/DL (CALC)
NONHDLC SERPL-MCNC: 82 MG/DL (CALC)
POTASSIUM SERPL-SCNC: 3.8 MMOL/L (ref 3.5–5.3)
PROT SERPL-MCNC: 6.5 G/DL (ref 6.1–8.1)
SODIUM SERPL-SCNC: 140 MMOL/L (ref 135–146)
TRIGL SERPL-MCNC: 79 MG/DL
TSH SERPL-ACNC: 1.62 MIU/L

## 2025-02-09 NOTE — RESULT ENCOUNTER NOTE
Sugar slightly low at 60 fasting but I am not concerned    Otherwise liver, kidney, electrolytes within normal limits    Cholesterol within normal limits at 155, HDL 73, triglyceride 79, LDL 66    Thyroid within normal limits    Hemoglobin A1c well within normal limits

## 2025-02-10 ENCOUNTER — APPOINTMENT (OUTPATIENT)
Dept: PRIMARY CARE | Facility: CLINIC | Age: 40
End: 2025-02-10
Payer: COMMERCIAL

## 2025-02-10 VITALS
BODY MASS INDEX: 21.44 KG/M2 | OXYGEN SATURATION: 94 % | SYSTOLIC BLOOD PRESSURE: 118 MMHG | DIASTOLIC BLOOD PRESSURE: 74 MMHG | WEIGHT: 121 LBS | HEIGHT: 63 IN | HEART RATE: 103 BPM

## 2025-02-10 DIAGNOSIS — Z00.00 HEALTHCARE MAINTENANCE: Primary | ICD-10-CM

## 2025-02-10 DIAGNOSIS — G90.A POTS (POSTURAL ORTHOSTATIC TACHYCARDIA SYNDROME): ICD-10-CM

## 2025-02-10 DIAGNOSIS — J18.9 PNEUMONIA OF RIGHT UPPER LOBE DUE TO INFECTIOUS ORGANISM: ICD-10-CM

## 2025-02-10 DIAGNOSIS — R91.1 LUNG NODULE: ICD-10-CM

## 2025-02-10 DIAGNOSIS — E16.2 HYPOGLYCEMIA: ICD-10-CM

## 2025-02-10 DIAGNOSIS — G43.909 MIGRAINE, UNSPECIFIED, NOT INTRACTABLE, WITHOUT STATUS MIGRAINOSUS: ICD-10-CM

## 2025-02-10 DIAGNOSIS — D72.819 LEUKOPENIA, UNSPECIFIED TYPE: ICD-10-CM

## 2025-02-10 PROCEDURE — 93000 ELECTROCARDIOGRAM COMPLETE: CPT | Performed by: STUDENT IN AN ORGANIZED HEALTH CARE EDUCATION/TRAINING PROGRAM

## 2025-02-10 PROCEDURE — 1036F TOBACCO NON-USER: CPT | Performed by: STUDENT IN AN ORGANIZED HEALTH CARE EDUCATION/TRAINING PROGRAM

## 2025-02-10 PROCEDURE — 3008F BODY MASS INDEX DOCD: CPT | Performed by: STUDENT IN AN ORGANIZED HEALTH CARE EDUCATION/TRAINING PROGRAM

## 2025-02-10 PROCEDURE — 99395 PREV VISIT EST AGE 18-39: CPT | Performed by: STUDENT IN AN ORGANIZED HEALTH CARE EDUCATION/TRAINING PROGRAM

## 2025-02-10 PROCEDURE — 3078F DIAST BP <80 MM HG: CPT | Performed by: STUDENT IN AN ORGANIZED HEALTH CARE EDUCATION/TRAINING PROGRAM

## 2025-02-10 PROCEDURE — 99213 OFFICE O/P EST LOW 20 MIN: CPT | Performed by: STUDENT IN AN ORGANIZED HEALTH CARE EDUCATION/TRAINING PROGRAM

## 2025-02-10 PROCEDURE — 3074F SYST BP LT 130 MM HG: CPT | Performed by: STUDENT IN AN ORGANIZED HEALTH CARE EDUCATION/TRAINING PROGRAM

## 2025-02-10 ASSESSMENT — PATIENT HEALTH QUESTIONNAIRE - PHQ9
SUM OF ALL RESPONSES TO PHQ9 QUESTIONS 1 AND 2: 0
1. LITTLE INTEREST OR PLEASURE IN DOING THINGS: NOT AT ALL
2. FEELING DOWN, DEPRESSED OR HOPELESS: NOT AT ALL

## 2025-02-10 NOTE — PROGRESS NOTES
"Subjective   Patient ID: Harper Roberts \"Jose Carlos" is a 39 y.o. female who presents for Annual Exam.  Today she is accompanied by alone.     HPI  Health Maintenance  Overall patient is doing well.   Immunization:   Tdap 2022  Influenza vaccine UTD  COVID-19 vaccine (Pfizer Moderna) UTD  Colon Cancer Screening: No family history  OB/GYN: Sees Dr. Bernal; Pap smear  due  Stated that she has been having some abnormal uterine bleeding  Biopsy was actually performed as there was thickened endometrial tissue  Currently has IUD placed  Past medical history of leukopenia, iron deficiency anemia s/p iron infusion in November 2024, and Lyme disease.   Cholesterol within normal limits at 155, HDL 73, triglyceride 79, LDL 66  Diet: Working on her diet  Exercise: Attempting to exercise more frequently  Tobacco: Denies use  EtOH: Rarely/socially     2. Trigger finger  Patient has been noticing that she is experiencing what she believes is a trigger finger of her left middle finger   Had a steroid injection by Dr. Enio Peralta 10/17  State that the steroid has help for a month, but occasionally still experience the trigger pain, with stocking sensation.  Also admits that this is not really bothersome to her so willing to just monitor for worsening symptoms     3. Migraines  Continues using Nurtec as indicated  Requesting refills to be sent to her pharmacy     4.  POTS  Last time was seen with a neurology in January 2023  Currently asymptomatic and feels well    5. Pneumonia/lung nodule  Patient is feeling otilia  December 13, 2024 CT scan showed multifocal clustered nodular opacities throughout the right upper lobe  Repeat CT scan on January 14, 2025, showed resolution of previously reported infiltrates, but persistence of a 6 mm solid single nodule, and recommend follow-up CT scan in 12 months    6.  Hypoglycemia  Patient has concerns of recurrent hypoglycemic episodes associated with jittering and palpitation, and feeling " weak  Admits that she has had this symptoms for most of her life and associated also with dysautonomia/POTS.  Patient had a glucometer for the last few weeks showing value in the 60s and occasionally in 50s.  But admits most of her values has been between 70 and 100.  Willing to discuss this further at today's visit     Current Outpatient Medications on File Prior to Visit   Medication Sig Dispense Refill    albuterol (Proventil HFA) 90 mcg/actuation inhaler Inhale 2 puffs every 4 hours if needed for wheezing or shortness of breath. 6.7 g 0    amoxicillin-pot clavulanate (Augmentin) 875-125 mg tablet Take 1 tablet (875 mg) by mouth 2 times a day. 20 tablet 0    levonorgestreL (Liletta) 20.4 mcg/24 hrs (8 yrs) 52 mg intrauterine device by intrauterine route 1 time.      rimegepant (Nurtec ODT) 75 mg tablet,disintegrating Take 1 tablet (75 mg) by mouth if needed (as need for migraines). 16 tablet 3     No current facility-administered medications on file prior to visit.        Allergies   Allergen Reactions    Cefaclor Rash    Doxycycline Rash       Immunization History   Administered Date(s) Administered    COVID-19, mRNA, LNP-S, PF, 30 mcg/0.3 mL dose 04/10/2021, 05/01/2021, 01/20/2022    Flu vaccine (IIV4), preservative free *Check age/dose* 11/07/2022, 10/12/2023    Flu vaccine, trivalent, preservative free, age 6 months and greater (Fluarix/Fluzone/Flulaval) 12/27/2024    Hepatitis B vaccine, adult *Check Product/Dose* 01/25/2013, 02/22/2013, 08/02/2013    Influenza, Unspecified 10/01/2014    Tdap vaccine, age 7 year and older (BOOSTRIX, ADACEL) 06/12/2015, 07/11/2022         Review of Systems  All pertinent positive symptoms are included in the history of present illness.  All other systems have been reviewed and are negative and noncontributory to this patient's current ailments.     Objective   /74 (BP Location: Left arm, Patient Position: Sitting, BP Cuff Size: Adult)   Pulse 103   Ht 1.588 m (5'  "2.5\")   Wt 54.9 kg (121 lb)   SpO2 94%   BMI 21.78 kg/m²   BSA: 1.56 meters squared  Orders Only on 02/08/2025   Component Date Value Ref Range Status    CHOLESTEROL, TOTAL 02/08/2025 155  <200 mg/dL Final    HDL CHOLESTEROL 02/08/2025 73  > OR = 50 mg/dL Final    TRIGLYCERIDES 02/08/2025 79  <150 mg/dL Final    LDL-CHOLESTEROL 02/08/2025 66  mg/dL (calc) Final    Comment: Reference range: <100     Desirable range <100 mg/dL for primary prevention;    <70 mg/dL for patients with CHD or diabetic patients   with > or = 2 CHD risk factors.     LDL-C is now calculated using the Иван   calculation, which is a validated novel method providing   better accuracy than the Friedewald equation in the   estimation of LDL-C.   Selvin BOLIVAR et al. ANAHI. 2013;310(19): 6212-4433   (http://education.Seevibes.Stat Doctors/faq/OTD052)      CHOL/HDLC RATIO 02/08/2025 2.1  <5.0 (calc) Final    NON HDL CHOLESTEROL 02/08/2025 82  <130 mg/dL (calc) Final    Comment: For patients with diabetes plus 1 major ASCVD risk   factor, treating to a non-HDL-C goal of <100 mg/dL   (LDL-C of <70 mg/dL) is considered a therapeutic   option.      GLUCOSE 02/08/2025 60 (L)  65 - 139 mg/dL Final    Comment:           Non-fasting reference interval         UREA NITROGEN (BUN) 02/08/2025 15  7 - 25 mg/dL Final    CREATININE 02/08/2025 0.79  0.50 - 0.97 mg/dL Final    EGFR 02/08/2025 99  > OR = 60 mL/min/1.73m2 Final    SODIUM 02/08/2025 140  135 - 146 mmol/L Final    POTASSIUM 02/08/2025 3.8  3.5 - 5.3 mmol/L Final    CHLORIDE 02/08/2025 104  98 - 110 mmol/L Final    CARBON DIOXIDE 02/08/2025 28  20 - 32 mmol/L Final    ELECTROLYTE BALANCE 02/08/2025 8  7 - 17 mmol/L (calc) Final    CALCIUM 02/08/2025 8.9  8.6 - 10.2 mg/dL Final    PROTEIN, TOTAL 02/08/2025 6.5  6.1 - 8.1 g/dL Final    ALBUMIN 02/08/2025 4.4  3.6 - 5.1 g/dL Final    BILIRUBIN, TOTAL 02/08/2025 0.8  0.2 - 1.2 mg/dL Final    ALKALINE PHOSPHATASE 02/08/2025 38  31 - 125 U/L Final    " AST 02/08/2025 14  10 - 30 U/L Final    ALT 02/08/2025 13  6 - 29 U/L Final    TSH W/REFLEX TO FT4 02/08/2025 1.62  mIU/L Final    Comment:           Reference Range                         > or = 20 Years  0.40-4.50                              Pregnancy Ranges            First trimester    0.26-2.66            Second trimester   0.55-2.73            Third trimester    0.43-2.91      HEMOGLOBIN A1c 02/08/2025 5.0  <5.7 % of total Hgb Final    Comment: For the purpose of screening for the presence of  diabetes:     <5.7%       Consistent with the absence of diabetes  5.7-6.4%    Consistent with increased risk for diabetes              (prediabetes)  > or =6.5%  Consistent with diabetes     This assay result is consistent with a decreased risk  of diabetes.     Currently, no consensus exists regarding use of  hemoglobin A1c for diagnosis of diabetes in children.     According to American Diabetes Association (ADA)  guidelines, hemoglobin A1c <7.0% represents optimal  control in non-pregnant diabetic patients. Different  metrics may apply to specific patient populations.   Standards of Medical Care in Diabetes(ADA).          eAG (mg/dL) 02/08/2025 97  mg/dL Final    eAG (mmol/L) 02/08/2025 5.4  mmol/L Final       Physical Exam  CONSTITUTIONAL - well nourished, well developed, looks like stated age, in no acute distress, not ill-appearing, and not tired appearing  SKIN - normal skin color and pigmentation, normal skin turgor without rash, lesions, or nodules visualized  HEAD - no trauma, normocephalic  NECK - supple without rigidity, no neck mass was observed, no thyromegaly or thyroid nodules  CHEST - clear to auscultation, no wheezing, no crackles and no rales, good effort  CARDIAC - regular rate and regular rhythm, no skipped beats, no murmur  ABDOMEN - no organomegaly, soft, nontender, nondistended, normal bowel sounds  EXTREMITIES - no edema, no deformities  NEUROLOGICAL - normal gait, normal balance, normal  motor, no ataxia  PSYCHIATRIC - alert, pleasant and cordial, age-appropriate  IMMUNOLOGIC - no cervical lymphadenopathy     Assessment/Plan   1. Health maintenance  Complete history and physical examination was performed  EKG reveals normal sinus rhythm without acute changes  Most recent lab work shows normal kidney liver function, with normal electrolytes, normal lipid panel, normal TSH and hemoglobin A1c    2. Trigger finger  Continue monitoring signs/symptoms and if they worsen please notify the office and we will discuss this further     3. Migraines  Continue using Nurtec as currently prescribed  Refill sent to your pharmacy     4.  POTS  Please continue following up with neurology per their protocol  Continue monitoring signs/symptoms    5. Pneumonia   Patient is feeling better/lung nodule  CT scan showed resolution of the previously reported infiltrate/pneumonia  There are continued small pulmonary nodules  These is smaller than 6 mm in size so we will order a chest CT in 12 months, for monitoring and confirming complete resolution of the nodule.    6.  Hyperglycemia  Discussed in length about hypoglycemia, and its meaning in different patient population.  Reassured the patient that since she does not have chronic liver disease or endocrine pancreatic insufficiency, her hyperglycemic levels are not concerning at this moment and this can be may be a low normal variant.  Instructed to monitor symptoms.    Thank you for letting us be a part of your care team.  Please call the office if you have further questions or concerns regarding your care    Otherwise, please follow-up in 6-12 months for continued care and refills as well as your yearly physical examination    Edie White MD  PGY2, FM Resident

## 2025-03-04 PROCEDURE — 83540 ASSAY OF IRON: CPT

## 2025-03-04 PROCEDURE — 82607 VITAMIN B-12: CPT

## 2025-03-04 PROCEDURE — 83550 IRON BINDING TEST: CPT

## 2025-03-04 PROCEDURE — 80053 COMPREHEN METABOLIC PANEL: CPT

## 2025-03-04 PROCEDURE — 82728 ASSAY OF FERRITIN: CPT

## 2025-03-04 PROCEDURE — 85025 COMPLETE CBC W/AUTO DIFF WBC: CPT

## 2025-03-04 PROCEDURE — 36415 COLL VENOUS BLD VENIPUNCTURE: CPT

## 2025-03-05 ENCOUNTER — LAB (OUTPATIENT)
Dept: LAB | Facility: HOSPITAL | Age: 40
End: 2025-03-05
Payer: COMMERCIAL

## 2025-03-05 DIAGNOSIS — R06.02 SOB (SHORTNESS OF BREATH): ICD-10-CM

## 2025-03-05 DIAGNOSIS — J98.8 CONGESTION OF UPPER AIRWAY: ICD-10-CM

## 2025-03-05 DIAGNOSIS — D72.9 ABNORMAL WHITE BLOOD CELL COUNT: ICD-10-CM

## 2025-03-05 DIAGNOSIS — E61.1 IRON DEFICIENCY: Primary | ICD-10-CM

## 2025-03-05 DIAGNOSIS — E16.2 HYPOGLYCEMIA: ICD-10-CM

## 2025-03-05 DIAGNOSIS — R05.8 PRODUCTIVE COUGH: ICD-10-CM

## 2025-03-05 DIAGNOSIS — R06.2 WHEEZING: ICD-10-CM

## 2025-03-05 DIAGNOSIS — D50.8 IRON DEFICIENCY ANEMIA SECONDARY TO INADEQUATE DIETARY IRON INTAKE: ICD-10-CM

## 2025-03-05 LAB
ALBUMIN SERPL BCP-MCNC: 4.9 G/DL (ref 3.4–5)
ALP SERPL-CCNC: 45 U/L (ref 33–110)
ALT SERPL W P-5'-P-CCNC: 10 U/L (ref 7–45)
ANION GAP SERPL CALC-SCNC: 15 MMOL/L (ref 10–20)
AST SERPL W P-5'-P-CCNC: 14 U/L (ref 9–39)
BASOPHILS # BLD AUTO: 0.03 X10*3/UL (ref 0–0.1)
BASOPHILS NFR BLD AUTO: 0.6 %
BILIRUB SERPL-MCNC: 0.5 MG/DL (ref 0–1.2)
BUN SERPL-MCNC: 16 MG/DL (ref 6–23)
CALCIUM SERPL-MCNC: 9.8 MG/DL (ref 8.6–10.6)
CHLORIDE SERPL-SCNC: 101 MMOL/L (ref 98–107)
CO2 SERPL-SCNC: 26 MMOL/L (ref 21–32)
CREAT SERPL-MCNC: 0.68 MG/DL (ref 0.5–1.05)
EGFRCR SERPLBLD CKD-EPI 2021: >90 ML/MIN/1.73M*2
EOSINOPHIL # BLD AUTO: 0.06 X10*3/UL (ref 0–0.7)
EOSINOPHIL NFR BLD AUTO: 1.1 %
ERYTHROCYTE [DISTWIDTH] IN BLOOD BY AUTOMATED COUNT: 11.8 % (ref 11.5–14.5)
FERRITIN SERPL-MCNC: 96 NG/ML (ref 8–150)
GLUCOSE SERPL-MCNC: 76 MG/DL (ref 74–99)
HCT VFR BLD AUTO: 41.9 % (ref 36–46)
HGB BLD-MCNC: 13.8 G/DL (ref 12–16)
HOLD SPECIMEN: NORMAL
HOLD SPECIMEN: NORMAL
IMM GRANULOCYTES # BLD AUTO: 0.02 X10*3/UL (ref 0–0.7)
IMM GRANULOCYTES NFR BLD AUTO: 0.4 % (ref 0–0.9)
IRON SATN MFR SERPL: 33 % (ref 25–45)
IRON SERPL-MCNC: 97 UG/DL (ref 35–150)
LYMPHOCYTES # BLD AUTO: 1.62 X10*3/UL (ref 1.2–4.8)
LYMPHOCYTES NFR BLD AUTO: 30.5 %
MCH RBC QN AUTO: 31.2 PG (ref 26–34)
MCHC RBC AUTO-ENTMCNC: 32.9 G/DL (ref 32–36)
MCV RBC AUTO: 95 FL (ref 80–100)
MONOCYTES # BLD AUTO: 0.56 X10*3/UL (ref 0.1–1)
MONOCYTES NFR BLD AUTO: 10.5 %
NEUTROPHILS # BLD AUTO: 3.03 X10*3/UL (ref 1.2–7.7)
NEUTROPHILS NFR BLD AUTO: 56.9 %
NRBC BLD-RTO: 0 /100 WBCS (ref 0–0)
PLATELET # BLD AUTO: 181 X10*3/UL (ref 150–450)
POTASSIUM SERPL-SCNC: 3.9 MMOL/L (ref 3.5–5.3)
PROT SERPL-MCNC: 7 G/DL (ref 6.4–8.2)
RBC # BLD AUTO: 4.42 X10*6/UL (ref 4–5.2)
SODIUM SERPL-SCNC: 138 MMOL/L (ref 136–145)
TIBC SERPL-MCNC: 296 UG/DL (ref 240–445)
UIBC SERPL-MCNC: 199 UG/DL (ref 110–370)
VIT B12 SERPL-MCNC: 435 PG/ML (ref 211–911)
WBC # BLD AUTO: 5.3 X10*3/UL (ref 4.4–11.3)

## 2025-03-11 ENCOUNTER — TELEMEDICINE (OUTPATIENT)
Dept: HEMATOLOGY/ONCOLOGY | Facility: CLINIC | Age: 40
End: 2025-03-11
Payer: COMMERCIAL

## 2025-03-11 DIAGNOSIS — J98.8 CONGESTION OF UPPER AIRWAY: ICD-10-CM

## 2025-03-11 DIAGNOSIS — E16.2 HYPOGLYCEMIA: ICD-10-CM

## 2025-03-11 DIAGNOSIS — R06.02 SOB (SHORTNESS OF BREATH): ICD-10-CM

## 2025-03-11 DIAGNOSIS — D72.9 ABNORMAL WHITE BLOOD CELL COUNT: ICD-10-CM

## 2025-03-11 DIAGNOSIS — D50.8 IRON DEFICIENCY ANEMIA SECONDARY TO INADEQUATE DIETARY IRON INTAKE: ICD-10-CM

## 2025-03-11 DIAGNOSIS — R06.2 WHEEZING: ICD-10-CM

## 2025-03-11 DIAGNOSIS — E61.1 IRON DEFICIENCY: ICD-10-CM

## 2025-03-11 DIAGNOSIS — R05.8 PRODUCTIVE COUGH: ICD-10-CM

## 2025-03-11 PROCEDURE — 99203 OFFICE O/P NEW LOW 30 MIN: CPT

## 2025-03-11 RX ORDER — INHALER, ASSIST DEVICES
1 SPACER (EA) MISCELLANEOUS SEE ADMIN INSTRUCTIONS
Qty: 1 EACH | Refills: 0 | Status: SHIPPED | OUTPATIENT
Start: 2025-03-11

## 2025-03-11 RX ORDER — ALBUTEROL SULFATE 90 UG/1
2 INHALANT RESPIRATORY (INHALATION) EVERY 6 HOURS PRN
Qty: 18 G | Refills: 2 | Status: SHIPPED | OUTPATIENT
Start: 2025-03-11

## 2025-03-11 NOTE — PROGRESS NOTES
"[facility]    PATIENT VISIT INFORMATION    Visit Type: Follow up     I performed this visit using real-time telehealth tools, including an audio/video connection between (Cassie Roberts at her home) and (Henna Greco, JESUS at [facility])  Patient consents to telemedicine service today and understands the limitations of the visit, no physical examination and all issues may not be able to be addressed today, other than brief neuro and psych assessment.     Referring Provider: Gordo Leblanc DO  Reason for referral: Abnormal white count   Primary Practice Provider: Gordo Leblanc DO    HEMATOLOGY HISTORY    38-year-old female presents for evaluation of abnormal white cell count.  Referred by her primary care physician.    Leukopenia noted from 2 years ago, but she recall it transiently over the last 20 years, starting in college. Anemic during pregnancy.  Per medical record available the patient's WBCs ranged from 3.1-4.0.  No other notable abnormalities on CBC with differential.    Patient states diagnosis of Sjogren's disease by rheumatology in [place], potentially lupus, POTS, and in [place] 8 to 10 years ago Lyme disease.     Iron deficiency, last iron infusion 11/4/2024, Venofer 3/3.    CT chest without IV contrast 1/14/2025 shows resolution of previously diagnosed pneumonia/infiltrates, small pulmonary nodule persists as described, 6 mm solid and single. Repeat 12 months if symptoms persist.     HISTORY OF PRESENT ILLNESS     ID Statement: Harper Roberts \"Cassie\" is a 39-year-old female    Chief Complaint: \"Still wheezing a couple times a day\"    Interval History:   She presents for follow up.  She had pneumonia in January and still feels wheezing daily with shortness of breath.  She states she had a PFT a few years ago and was diagnosed with asthma at that time.  She is out of the albuterol.      Hx of multiple sinus infections and kidney infections in the " "last 6 month.  She reports namely respiratory infections, total of 7 infections in last 6 months.   Follows specialist. Fatigue and loss of energy she feels in baseline.   She reports Appetite good, no weight loss recently. Lost weight and low appetite right after birth of her daughter 2 years ago. Concerned about hypoglycemia.  She wore a monitor for a couple and she would drop to the 30s in the middle of the night return to 60-70 fasting glucose in the morning.  Will follow PCP.     She notes a flare up a couple months. Chest pain for years, shooting pain across her rib cage over the last 10 years, SOB daily exertional and at rest occasionally, joint pain all over (feet, hands, neck, lower back, knees locking up), trigger finger and elbow pain, received cortisone injection Fall 2023.     \"Migraines most of my life with aura and blurry vision.\" About 15 migraines a month. Sometimes ocular migraines. She has not seen Neuro.    Nose bleeds on an off, not worse, improved.    Notable rashes off and on, started 10 years ago. Pityriasis rosacea diagnosed.  Locations include chest, face, arm. No itch. Gets worse with a hot shower.  Always fatigue, tiered, loss of energy. Not able to nap due to work and children.  Reports a flush feeling. Temp 99F at times. Hot and cold fluctuations. Cold hands and feet all the time. Floaters with eyes. Worse with flare. Followed optometrist regularly and ophthalmologist 6 years ago. Optic nerve drusen diagnosed at age 12 years old.     IBS symptoms. EGD and colonoscopy 7 years ago blood in stool and in urine noted at that time. Dull pain in back left side and sharp pain under left rib with deep breathes over the last 10 years. Left ache on lower left abdomen. Swollen hands in morning, unable to wear wedding band. Better in afternoon.    Heavy periods 10 days on and 10 days off and spotting in between. Bleeding light every day since IUD placed two months ago. Biopsy performed. Lesions " "benign. Cycles improved.     Tinnitus 6 months ago. Numbness and tingling hands and feet sometimes face for 10 years off and on. Left breast biopsy 10 years ago diagnosed as fibroadenoma.     Hx of drenching night sweats before and after birth of daughter. Not any in the last 6 months. Oral sores 5-10 location and description as \"lumps on inner cheek and roof of mouth swollen.\" Sore throats daily, seasonal allergie, no other symptoms when they occur.   Recent sinus infection resolved 2 weeks ago.     Diagnosed with Lyme's Disease. Minocycline a couple months at a time. Follows a specialist out of [place].     Denies any lymphadenopathy or consistent neuropathy.  Sometimes urgent loose stool (IBS), Nausea ongoing as baseline, low blood sugar which helps with nausea. No constipation, urinating ok. No blood in urine now.     History of US of kidney and inflammation noted.     PAST/CURRENT HISTORY     MEDICAL/SURGICAL HISTORY  -Leukopenia  -ELBERT  -Molar miscarriage for 10-11 weeks   -Preeclampsia both pregnancies   -Emergency  with one pregnancy  -Second pregnancy placenta previa    -Sjogrens   -POTS  -Lupus?   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   -Asthma 10 years ago on inhalers   -Covid 2024 mild  -possible kidney stone   -Hypoglycemia   -Migraine headaches with aura  -Anemia in pregnancy  -Candidiasis of vagina  -Hypertension  -Lyme's disease    SOCIAL HISTORY  -Lives with  and two kids (2 years and 9 years old)  -Work place:      -Tobacco/smokeless use: denies   -Alcohol: on occasion a couple drinks a month   -Illicit drug or marijuana use: denies   -Latter-day or Spiritual beliefs: Humanistic   -Social Determinates of Health Concerns: none reported     FAMILY HISTORY  -Mom living 68 years old Rosacea, breast cancer stage 2 HER+ diagnosis at 58 years, lumpectomy and radiation, kidney stones   -Maternal cousin brain tumor  at age 35 years old " (many health issues)  -Maternal female cousin autoimmune disease unknown types  -Maternal Uncle end stages of colon stages 74 years old, hx autoimmune and EBV   -Maternal cousin female other autoimmune undiagnosed   -Paternal Uncle 50 years old diagnosed with stage 4 throat cancer non-smoker, living   -Paternal Uncle aortic aneurysm, covid prior  70 years old  -Paternal Uncle  MI (some life style risk factors)   -Maternal cousin NEWLY DIAGNOSED with breast cancer 37 years old  -No other known history of hematologic, bleeding, clotting, autoimmune, genetic, or malignant disorders in the family.     OCCUPATIONAL/ENVIRONMENTAL HISTORY/EXPOSURES:  -None reported    Active Problems, Allergy List, Medication List, and PMH/PSH/FH/Social Hx have been reviewed and reconciled in chart. Updates made when necessary.     REVIEW OF SYSTEMS   A review of systems has been completed and are negative for complaints except what is stated in the assessment, HPI, IH, ROS, and/or past medical history.    ALLERGIES AND MEDICATIONS     Allergies and Intolerances:   Allergies   Allergen Reactions    Cefaclor Rash    Doxycycline Rash      Medication Profile:   Current Outpatient Medications   Medication Instructions    albuterol (Proventil HFA) 90 mcg/actuation inhaler 2 puffs, inhalation, Every 4 hours PRN    amoxicillin-pot clavulanate (Augmentin) 875-125 mg tablet 875 mg, oral, 2 times daily    levonorgestreL (Liletta) 20.4 mcg/24 hrs (8 yrs) 52 mg intrauterine device Once    rimegepant (NURTEC ODT) 75 mg, oral, As needed      Available Vaccination Record:   Immunization History   Administered Date(s) Administered    COVID-19, mRNA, LNP-S, PF, 30 mcg/0.3 mL dose 04/10/2021, 2021, 2022    Flu vaccine (IIV4), preservative free *Check age/dose* 2022, 10/12/2023    Flu vaccine, trivalent, preservative free, age 6 months and greater (Fluarix/Fluzone/Flulaval) 2024    Hepatitis B vaccine, adult *Check  "Product/Dose* 01/25/2013, 02/22/2013, 08/02/2013    Influenza, Unspecified 10/01/2014    Tdap vaccine, age 7 year and older (BOOSTRIX, ADACEL) 06/12/2015, 07/11/2022      PHYSICAL EXAM     Vital Signs/Measurements:       10/17/2024     3:31 PM 10/25/2024     2:36 PM 10/29/2024    11:35 AM 10/29/2024     1:21 PM 12/1/2024    12:04 PM 12/27/2024    10:19 AM 2/10/2025     1:35 PM   Vitals   Systolic 125 129 138 137 125 116 118   Diastolic 83 88 88 87 71 60 74   BP Location  Right arm Right arm   Left arm Left arm   Heart Rate 63 80 78  83 85 103   Temp  36.3 °C (97.3 °F) 36.4 °C (97.5 °F)  36.9 °C (98.4 °F)     Resp 16 16 16       Height       1.588 m (5' 2.5\")   Weight (lb)  119.93 120.15   120.8 121   BMI  21.57 kg/m2 21.61 kg/m2   21.73 kg/m2 21.78 kg/m2   BSA (m2)  1.55 m2 1.55 m2   1.55 m2 1.56 m2   Visit Report     Report Report Report        Performance:   ECOG Performance Status: 0     Grade ECOG performance status   0 Fully active, able to carry on all pre-disease performance without restriction   1 Restricted in physically strenuous activity but ambulatory and able to carry out work of a light or sedentary nature, e.g., light housework, office work   2 Ambulatory and capable of all selfcare but unable to carry out any work activities; Up and about more than 50% of waking hours   3 Capable of only limited selfcare, confined to bed or chair more than 50% of waking hours   4 Completely disabled; Cannot carry out any selfcare; Totally confined to bed or chair   5 Dead     Physical Exam:  General: Patient is awake/alert/oriented x3, no distress, ill-appearing, pallor   Psychological: Intact recent and remote memory, judgement, and insight. Appropriate mood, affect, and behavior     RESULTS/DATA     Labs:   Lab Results   Component Value Date    WBC 5.3 03/04/2025    NEUTROABS 3.03 03/04/2025    IGABSOL 0.02 03/04/2025    LYMPHSABS 1.62 03/04/2025    MONOSABS 0.56 03/04/2025    EOSABS 0.06 03/04/2025    BASOSABS " "0.03 03/04/2025    RBC 4.42 03/04/2025    MCV 95 03/04/2025    MCHC 32.9 03/04/2025    HGB 13.8 03/04/2025    HCT 41.9 03/04/2025     03/04/2025     Lab Results   Component Value Date    RETICCTPCT 1.0 06/13/2024      Lab Results   Component Value Date    CREATININE 0.68 03/04/2025    BUN 16 03/04/2025    EGFR >90 03/04/2025     03/04/2025    K 3.9 03/04/2025     03/04/2025    CO2 26 03/04/2025      Lab Results   Component Value Date    ALT 10 03/04/2025    AST 14 03/04/2025    ALKPHOS 45 03/04/2025    BILITOT 0.5 03/04/2025      Lab Results   Component Value Date    TSH 1.62 02/08/2025     Lab Results   Component Value Date    TSH 1.62 02/08/2025     Lab Results   Component Value Date    IRON 97 03/04/2025    TIBC 296 03/04/2025    FERRITIN 96 03/04/2025      Lab Results   Component Value Date    VAWAHDRK84 435 03/04/2025        Lab Results   Component Value Date    LANE Negative 06/13/2024    RF <10 09/24/2024    SEDRATE 17 06/13/2024      Lab Results   Component Value Date    CRP <0.10 06/13/2024      No results found for: \"COURTNEY\"  Lab Results   Component Value Date     08/26/2022        Radiology/Studies:   US abdomen complete 6/20/2024  IMPRESSION:  Unremarkable abdominal ultrasound  Incidental bilateral ovarian cysts or follicles as described above.     ASSESSMENT/PLAN     Assessment and Plan:   #1. Leukopenia   #2. Iron deficiency Anemia   39-year-old female presents for evaluation of abnormal white cell count.  Referred by her primary care physician.    Leukopenia noted from 2 years ago, but she recall it transiently over the last 20 years, starting in college. Anemic during pregnancy.  Per medical record available the patient's WBCs ranged from 3.1-4.0.  No other notable abnormalities on CBC with differential.    Patient states diagnosis of Sjogren's disease by rheumatology in [place], potentially lupus, POTS, and in [place] 8 to 10 years ago Lyme disease.     Discussed some " of the potential causes of leukopenia including nutritional deficiency, autoimmune, inflammatory or illness, bone marrow dysplasia or genetics.     Recent EBV infections. PCR negative. Mono sometime in recent past.  LANE and inflammatory markers negative.  Hepatitis B, C and HIV negative. US abdomen unremarkable.   Notably borderline B12 and iron.  Tsat 25% and ferritin of 23.  She will start a B12 supplement take daily to every other day.  B12 1000 mg.  She was started on low-dose iron with vitamin C. We will reassess in 3 months. Rheum consult may not be necessary at this time.     10/9/2024 ELBERT noted and lowe B12 levels.  She will take B12 daily she will purchase this over-the-counter.  IV iron ordered.  Hypoglycemia noted on her test 12 days ago without fasting.  She will follow primary.  CBC unremarkable.  Ultrasound unremarkable.    12/11/2024: Iron deficiency with the most recent iron infusions completed November 4, 2024.  She reports that she felt some improvement though still remains fatigued.  She thinks this may be partly due to recent illnesses.  Iron stores are repleted.  Leukopenia resolved since September 2024.  Uncertain if this is related to nutritional deficiency of iron versus autoimmune etiology.  We will reassess in 3 months.    Patient questing stat imaging.  CT of chest ordered for night sweats, shortness of breath, worsening congestion, and cough.  Scheduled for 12/13/2024.  Potassium slightly low at 3.1 during last chemistry assessment a week ago.  Repeat normal levels today, potassium of 3.5.  B12 levels improved.    She consulted with genetics and has a follow-up visit for results of genetic testing regarding: Breast cancer 12/16/2024.    3/11/2025 patient will follow primary regarding wheezing and hypoglycemia concerns.  Provided refill of albuterol and asked her to follow-up with primary for PFT if wheezing persists. She recently had Pneumonia in January.   Hematology labs look good. No  iron deficiency at this time. Anemia corrected. Kidney and liver function good. We will reassess in 4 months. Continue B12 supplement.     She was recently advised by her gynecologist that she can take a break from screening mammograms.  Maternal cousin recently diagnosis with breast cancer at age 37 years old.  Advise she reach out to breast specialist she saw recently for screening management.    **Please follow with specialties as scheduled for other comorbidities and routine health screenings.**    Follow up:    RTC:  -4 months with labs    Medications:  -B12 500 mg three times per week    Imaging/Testing:  -NA    Referral:  -Primary care for wheezing with SOB, and hypoglycemia concerns     Other Pertinent Appointments:  -NA    Patient Discussion Summary:  Discussed plan of care in detail. Patient states understanding and in agreement. Answered all questions. They will call with any additional questions and/or concerns.    Thank you for allowing me to participate in your care.     Sincerely,  DENIS Renner-CNP     Hematology/Oncology Clinical Nurse Practitioner     [facility]   [id number]  [place] [address]  Office #: [phone]    DISCLAIMER:   In preparing for this visit and writing this note, I reviewed all the previous electronic medical records (testing, labs, imaging, and other procedures, provider notes, and medical charts) of the patient available in the physician portal pertinent to patient care. Significant findings which helped in decision making are recorded in this chart.  A few details copied from my note on 12/11/2024, the details have been updated and all reflect current decision making from today, 03/11/2025.    This document may have been written by voice recognition software.  There may be some incorrect wording, spelling and/or spelling errors or punctuation errors that were not corrected prior to committing the note to the  medical record. Please request clarification if there is documentation error or clarification is needed.   Time based billing: Please see documentation within this specific encounter.

## 2025-06-05 ENCOUNTER — OFFICE VISIT (OUTPATIENT)
Dept: PRIMARY CARE | Facility: CLINIC | Age: 40
End: 2025-06-05
Payer: COMMERCIAL

## 2025-06-05 VITALS — HEART RATE: 88 BPM | DIASTOLIC BLOOD PRESSURE: 78 MMHG | SYSTOLIC BLOOD PRESSURE: 122 MMHG | OXYGEN SATURATION: 98 %

## 2025-06-05 DIAGNOSIS — R10.9 FLANK PAIN: ICD-10-CM

## 2025-06-05 DIAGNOSIS — R10.819 SUPRAPUBIC TENDERNESS: ICD-10-CM

## 2025-06-05 DIAGNOSIS — R31.0 GROSS HEMATURIA: Primary | ICD-10-CM

## 2025-06-05 DIAGNOSIS — R30.0 DYSURIA: ICD-10-CM

## 2025-06-05 LAB
POC APPEARANCE, URINE: ABNORMAL
POC BILIRUBIN, URINE: NEGATIVE
POC BLOOD, URINE: ABNORMAL
POC COLOR, URINE: ABNORMAL
POC GLUCOSE, URINE: NEGATIVE MG/DL
POC KETONES, URINE: ABNORMAL MG/DL
POC LEUKOCYTES, URINE: ABNORMAL
POC NITRITE,URINE: NEGATIVE
POC PH, URINE: 6.5 PH
POC PROTEIN, URINE: ABNORMAL MG/DL
POC SPECIFIC GRAVITY, URINE: 1.01
POC UROBILINOGEN, URINE: 0.2 EU/DL

## 2025-06-05 PROCEDURE — 1036F TOBACCO NON-USER: CPT | Performed by: STUDENT IN AN ORGANIZED HEALTH CARE EDUCATION/TRAINING PROGRAM

## 2025-06-05 PROCEDURE — 99214 OFFICE O/P EST MOD 30 MIN: CPT | Performed by: STUDENT IN AN ORGANIZED HEALTH CARE EDUCATION/TRAINING PROGRAM

## 2025-06-05 PROCEDURE — 81002 URINALYSIS NONAUTO W/O SCOPE: CPT | Performed by: STUDENT IN AN ORGANIZED HEALTH CARE EDUCATION/TRAINING PROGRAM

## 2025-06-05 PROCEDURE — 3078F DIAST BP <80 MM HG: CPT | Performed by: STUDENT IN AN ORGANIZED HEALTH CARE EDUCATION/TRAINING PROGRAM

## 2025-06-05 PROCEDURE — 3074F SYST BP LT 130 MM HG: CPT | Performed by: STUDENT IN AN ORGANIZED HEALTH CARE EDUCATION/TRAINING PROGRAM

## 2025-06-05 RX ORDER — NITROFURANTOIN 25; 75 MG/1; MG/1
100 CAPSULE ORAL 2 TIMES DAILY
Qty: 14 CAPSULE | Refills: 0 | Status: SHIPPED | OUTPATIENT
Start: 2025-06-05 | End: 2025-06-12

## 2025-06-05 ASSESSMENT — PATIENT HEALTH QUESTIONNAIRE - PHQ9
2. FEELING DOWN, DEPRESSED OR HOPELESS: NOT AT ALL
SUM OF ALL RESPONSES TO PHQ9 QUESTIONS 1 AND 2: 0
1. LITTLE INTEREST OR PLEASURE IN DOING THINGS: NOT AT ALL

## 2025-06-05 NOTE — PROGRESS NOTES
"Subjective   Patient ID: Harper Roberts \"Jose Carlos" is a 39 y.o. female who presents for UTI and Possible Kidney Stone.  Today she is accompanied by accompanied by spouse.     HPI    Presents with gross hematuria, UTI and possible kidney stone.  States that she has had blood with urination for the past 3 days in addition complains of stomach cramps and left lower back pain.  States that the pain in her stomach is dull and occasionally sharp.  As well as dull low back left-sided pain.  States that she has a history of 2 kidney infections last summer that were likely presumed to be caused by kidney stones but were never found on ultrasound.  In addition states that 6 years ago she had a similar issue where they never found a kidney stone on ultrasound.  Had a urine culture from June 2024 that showed resistance to ampicillin.  In addition has some dysuria, no burning or frequency.  Had her period 7 days ago.  Will be going vacation tomorrow.      Medications Ordered Prior to Encounter[1]     Allergies[2]    Immunization History   Administered Date(s) Administered    COVID-19, mRNA, LNP-S, PF, 30 mcg/0.3 mL dose 04/10/2021, 05/01/2021, 01/20/2022    Flu vaccine (IIV4), preservative free *Check age/dose* 11/07/2022, 10/12/2023    Flu vaccine, trivalent, preservative free, age 6 months and greater (Fluarix/Fluzone/Flulaval) 12/27/2024    Hepatitis B vaccine, adult *Check Product/Dose* 01/25/2013, 02/22/2013, 08/02/2013    Influenza, Unspecified 10/01/2014    Tdap vaccine, age 7 year and older (BOOSTRIX, ADACEL) 06/12/2015, 07/11/2022         Review of Systems  All pertinent positive symptoms are included in the history of present illness.  All other systems have been reviewed and are negative and noncontributory to this patient's current ailments.     Objective   /78 (BP Location: Left arm, Patient Position: Sitting, BP Cuff Size: Adult)   Pulse 88   SpO2 98%   BSA: There is no height or weight on file to calculate " BSA.  Office Visit on 06/05/2025   Component Date Value Ref Range Status    POC Color, Urine 06/05/2025 Red (A)  Straw, Yellow, Light-Yellow Final    POC Appearance, Urine 06/05/2025 Cloudy (A)  Clear Final    POC Glucose, Urine 06/05/2025 NEGATIVE  NEGATIVE mg/dl Final    POC Bilirubin, Urine 06/05/2025 NEGATIVE  NEGATIVE Final    POC Ketones, Urine 06/05/2025 TRACE (A)  NEGATIVE mg/dl Final    POC Specific Gravity, Urine 06/05/2025 1.015  1.005 - 1.035 Final    POC Blood, Urine 06/05/2025 LARGE (3+) (A)  NEGATIVE Final    POC PH, Urine 06/05/2025 6.5  No Reference Range Established PH Final    POC Protein, Urine 06/05/2025 30 (1+) (A)  NEGATIVE mg/dl Final    POC Urobilinogen, Urine 06/05/2025 0.2  0.2, 1.0 EU/DL Final    Poc Nitrite, Urine 06/05/2025 NEGATIVE  NEGATIVE Final    POC Leukocytes, Urine 06/05/2025 TRACE (A)  NEGATIVE Final       Physical Exam  Constitutional:       Appearance: Normal appearance.   HENT:      Nose: Nose normal.      Mouth/Throat:      Mouth: Mucous membranes are moist.   Eyes:      Extraocular Movements: Extraocular movements intact.      Conjunctiva/sclera: Conjunctivae normal.   Cardiovascular:      Rate and Rhythm: Normal rate and regular rhythm.      Heart sounds: Normal heart sounds.   Pulmonary:      Effort: Pulmonary effort is normal.      Breath sounds: Normal breath sounds.   Abdominal:      General: Abdomen is flat.      Palpations: Abdomen is soft.      Tenderness: There is abdominal tenderness (Mild suprapubic). There is left CVA tenderness.   Musculoskeletal:         General: Tenderness (Left flank) present.   Skin:     General: Skin is warm and dry.   Neurological:      Mental Status: She is alert.           Assessment/Plan     Possible kidney stone/UTI/hematuria  Discussed CT imaging, due to patient being symptomatic at this time with gross hematuria and the results of the POCT UA we will go ahead and treat for UTI and possible kidney stone.  We will send her urine for  culture.  Will go ahead and treat her UTI with Macrobid at this time and will narrow down antibiotics pending her culture.  If her symptoms do not improve we will consider Flomax as well as CT imaging at that time.    Risks, benefits, and options of treatment(s) were discussed after reviewing all current medication(s) and drug allergy(ies)  I opted for the treatment that we discussed with instructions on the medication use for your underlying medical ailment(s)  I encouraged supportive care such as rest, fluids and Advil/Tylenol as warranted  Return to the clinic in 7-10 days or sooner if symptoms worsen or persist as we will then further evaluate         [1]   Current Outpatient Medications on File Prior to Visit   Medication Sig Dispense Refill    albuterol 90 mcg/actuation inhaler Inhale 2 puffs every 6 hours if needed for wheezing. 18 g 2    inhalational spacing device (BreatheRite MDI Spacer) inhaler 1 each see administration instructions. Use with albuterol inhaler as needed. 1 each 0    levonorgestreL (Liletta) 20.4 mcg/24 hrs (8 yrs) 52 mg intrauterine device by intrauterine route 1 time.      rimegepant (Nurtec ODT) 75 mg tablet,disintegrating Dissolve 1 tablet (75 mg) in the mouth if needed (as need for migraines). 16 tablet 3     No current facility-administered medications on file prior to visit.   [2]   Allergies  Allergen Reactions    Cefaclor Rash    Doxycycline Rash

## 2025-06-07 LAB
AMORPH SED URNS QL MICRO: NORMAL
APPEARANCE UR: NORMAL
BACTERIA #/AREA URNS HPF: NORMAL /[HPF]
BACTERIA UR CULT: NORMAL
BILIRUB UR QL STRIP: NORMAL
CAOX CRY #/AREA URNS HPF: NORMAL /[HPF]
CASTS #/AREA URNS LPF: NORMAL /[LPF]
COLOR UR: NORMAL
CRYSTALS #/AREA URNS HPF: NORMAL /[HPF]
GLUCOSE UR QL STRIP: NORMAL
GRAN CASTS #/AREA URNS LPF: NORMAL /[LPF]
HGB UR QL STRIP: NORMAL
HYALINE CASTS #/AREA URNS LPF: NORMAL /[LPF]
KETONES UR QL STRIP: NORMAL
LEUKOCYTE ESTERASE UR QL STRIP: NORMAL
NITRITE UR QL STRIP: NORMAL
PH UR STRIP: NORMAL [PH]
PROT UR QL STRIP: NORMAL
RBC #/AREA URNS HPF: NORMAL /[HPF]
RENAL EPI CELLS #/AREA URNS HPF: NORMAL /[HPF]
SERVICE CMNT-IMP: NORMAL
SP GR UR STRIP: NORMAL
SQUAMOUS #/AREA URNS HPF: NORMAL /[HPF]
TRANS CELLS #/AREA URNS HPF: NORMAL /[HPF]
TRI-PHOS CRY #/AREA URNS HPF: NORMAL /[HPF]
URATE CRY #/AREA URNS HPF: NORMAL /[HPF]
WBC #/AREA URNS HPF: NORMAL /[HPF]
YEAST #/AREA URNS HPF: NORMAL /[HPF]

## 2025-06-08 NOTE — RESULT ENCOUNTER NOTE
Urinalysis surprisingly showed mixed genital honey and was still waiting for the actual urinalysis    At this point, I would recommend she still takes the Macrobid for at least the next 5 days and when she returns back from vacation, we can easily repeat a urine culture to make sure there is nothing else underlying    Please advise on how the patient is feeling on Monday

## 2025-06-09 LAB
BACTERIA UR CULT: NORMAL
COLOR UR: NORMAL

## 2025-06-11 DIAGNOSIS — R10.9 FLANK PAIN: ICD-10-CM

## 2025-06-11 DIAGNOSIS — R31.0 GROSS HEMATURIA: Primary | ICD-10-CM

## 2025-06-19 ENCOUNTER — OFFICE VISIT (OUTPATIENT)
Dept: PRIMARY CARE | Facility: CLINIC | Age: 40
End: 2025-06-19
Payer: COMMERCIAL

## 2025-06-19 VITALS — DIASTOLIC BLOOD PRESSURE: 74 MMHG | SYSTOLIC BLOOD PRESSURE: 120 MMHG | OXYGEN SATURATION: 99 % | HEART RATE: 83 BPM

## 2025-06-19 DIAGNOSIS — R30.0 DYSURIA: ICD-10-CM

## 2025-06-19 DIAGNOSIS — N39.0 URINARY TRACT INFECTION WITH HEMATURIA, SITE UNSPECIFIED: ICD-10-CM

## 2025-06-19 DIAGNOSIS — R31.0 GROSS HEMATURIA: Primary | ICD-10-CM

## 2025-06-19 DIAGNOSIS — R31.9 URINARY TRACT INFECTION WITH HEMATURIA, SITE UNSPECIFIED: ICD-10-CM

## 2025-06-19 LAB
APPEARANCE UR: CLEAR
BACTERIA #/AREA URNS HPF: ABNORMAL /HPF
BACTERIA UR CULT: ABNORMAL
BILIRUB UR QL STRIP: NEGATIVE
COLOR UR: YELLOW
GLUCOSE UR QL STRIP: NEGATIVE
HGB UR QL STRIP: ABNORMAL
HYALINE CASTS #/AREA URNS LPF: ABNORMAL /LPF
KETONES UR QL STRIP: NEGATIVE
LEUKOCYTE ESTERASE UR QL STRIP: ABNORMAL
NITRITE UR QL STRIP: NEGATIVE
PH UR STRIP: 6.5 [PH] (ref 5–8)
POC APPEARANCE, URINE: ABNORMAL
POC BILIRUBIN, URINE: NEGATIVE
POC BLOOD, URINE: ABNORMAL
POC COLOR, URINE: ABNORMAL
POC GLUCOSE, URINE: NEGATIVE MG/DL
POC KETONES, URINE: NEGATIVE MG/DL
POC LEUKOCYTES, URINE: NEGATIVE
POC NITRITE,URINE: NEGATIVE
POC PH, URINE: 6 PH
POC PROTEIN, URINE: NEGATIVE MG/DL
POC SPECIFIC GRAVITY, URINE: 1.02
POC UROBILINOGEN, URINE: 0.2 EU/DL
PROT UR QL STRIP: NEGATIVE
RBC #/AREA URNS HPF: ABNORMAL /HPF
SERVICE CMNT-IMP: ABNORMAL
SP GR UR STRIP: 1.02 (ref 1–1.03)
SQUAMOUS #/AREA URNS HPF: ABNORMAL /HPF
WBC #/AREA URNS HPF: ABNORMAL /HPF

## 2025-06-19 PROCEDURE — 3078F DIAST BP <80 MM HG: CPT | Performed by: STUDENT IN AN ORGANIZED HEALTH CARE EDUCATION/TRAINING PROGRAM

## 2025-06-19 PROCEDURE — 3074F SYST BP LT 130 MM HG: CPT | Performed by: STUDENT IN AN ORGANIZED HEALTH CARE EDUCATION/TRAINING PROGRAM

## 2025-06-19 PROCEDURE — 99214 OFFICE O/P EST MOD 30 MIN: CPT | Performed by: STUDENT IN AN ORGANIZED HEALTH CARE EDUCATION/TRAINING PROGRAM

## 2025-06-19 PROCEDURE — 81002 URINALYSIS NONAUTO W/O SCOPE: CPT | Performed by: STUDENT IN AN ORGANIZED HEALTH CARE EDUCATION/TRAINING PROGRAM

## 2025-06-19 PROCEDURE — 1036F TOBACCO NON-USER: CPT | Performed by: STUDENT IN AN ORGANIZED HEALTH CARE EDUCATION/TRAINING PROGRAM

## 2025-06-19 RX ORDER — CIPROFLOXACIN 500 MG/1
500 TABLET, FILM COATED ORAL 2 TIMES DAILY
Qty: 14 TABLET | Refills: 0 | Status: SHIPPED | OUTPATIENT
Start: 2025-06-19 | End: 2025-06-26

## 2025-06-19 NOTE — PROGRESS NOTES
Subjective   Patient ID: Cassie Roberts is a 39 y.o. female who presents for UTI.    HPI   Patient is presenting to the office today to discuss continued signs/symptoms of dysuria and pressure    Unfortunately she was here on 6/5/2025 and at that time was diagnosed with a urinary tract infection  She was prescribed Macrobid but unfortunately her urine culture showed mixed genital honey    Continues to have some signs/symptoms as well as some gross hematuria    She did have a urine culture in 2024 that showed resistance to ampicillin but now she is not fully sure what is going on at this point    Continues to have a dull pain and even some diarrhea as well as some flank pain    Asking to be further evaluate/treat at this time  Denies any fevers or chills or any other acute signs/symptoms    Review of Systems  All pertinent positive symptoms are included in the history of present illness.    All other systems have been reviewed and are negative and noncontributory to this patient's current ailments.    Objective   /74 (BP Location: Left arm, Patient Position: Sitting, BP Cuff Size: Adult)   Pulse 83   SpO2 99%     Physical Exam  CONSTITUTIONAL - well nourished, well developed, looks like stated age, in no acute distress, not ill-appearing, and not tired appearing  SKIN - normal skin color and pigmentation, normal skin turgor without rash, lesions, or nodules visualized  HEAD - no trauma, normocephalic  EYES - normal external exam  CHEST -no distressed breathing, good effort  EXTREMITIES - no edema, no deformities  NEUROLOGICAL - normal balance, normal motor, no ataxia  PSYCHIATRIC - alert, pleasant and cordial, age-appropriate    Assessment/Plan   We had a long conversation about all of your signs/symptoms and I did order some blood work to be fully thorough including your kidney function as well as a CBC    I reordered a urine culture as well as a urinalysis today which did show blood    Due to not getting  better it appears that this could be a complicated or even worsening urinary tract infection that is still lingering so I did prescribe ciprofloxacin 500 mg taken twice daily for the next 7 days    Risks, benefits, and options of medication(s) above were discussed with instructions on the medication usage for your infection  I encouraged supportive care such as rest, fluids and Advil/Tylenol as warranted  Urine was sent for culture to ensure proper medication was prescribed for your particular type of infection  Recheck urinalysis in 10 to 14 days to ensure complete resolution (no need to see physician at that time)  RTC in 3-5 days or sooner if symptoms worsen or persist    Please follow-up and let me know on how you are feeling and we will wait for the pending urine culture that was just done a couple of days ago as well as we will wait for the new one that was obtained today    Lastly to be fully thorough I did place a requisition to follow-up with urology, Dr. Metzger

## 2025-06-20 LAB
ALBUMIN SERPL-MCNC: 4.8 G/DL (ref 3.6–5.1)
ALP SERPL-CCNC: 44 U/L (ref 31–125)
ALT SERPL-CCNC: 15 U/L (ref 6–29)
ANION GAP SERPL CALCULATED.4IONS-SCNC: 9 MMOL/L (CALC) (ref 7–17)
AST SERPL-CCNC: 15 U/L (ref 10–30)
BILIRUB SERPL-MCNC: 0.7 MG/DL (ref 0.2–1.2)
BUN SERPL-MCNC: 18 MG/DL (ref 7–25)
CALCIUM SERPL-MCNC: 9.6 MG/DL (ref 8.6–10.2)
CHLORIDE SERPL-SCNC: 102 MMOL/L (ref 98–110)
CO2 SERPL-SCNC: 27 MMOL/L (ref 20–32)
CREAT SERPL-MCNC: 0.75 MG/DL (ref 0.5–0.97)
EGFRCR SERPLBLD CKD-EPI 2021: 105 ML/MIN/1.73M2
ERYTHROCYTE [DISTWIDTH] IN BLOOD BY AUTOMATED COUNT: 11.8 % (ref 11–15)
GLUCOSE SERPL-MCNC: 88 MG/DL (ref 65–99)
HCT VFR BLD AUTO: 41.6 % (ref 35–45)
HGB BLD-MCNC: 13.9 G/DL (ref 11.7–15.5)
MCH RBC QN AUTO: 31.8 PG (ref 27–33)
MCHC RBC AUTO-ENTMCNC: 33.4 G/DL (ref 32–36)
MCV RBC AUTO: 95.2 FL (ref 80–100)
PLATELET # BLD AUTO: 183 THOUSAND/UL (ref 140–400)
PMV BLD REES-ECKER: 11.6 FL (ref 7.5–12.5)
POTASSIUM SERPL-SCNC: 4 MMOL/L (ref 3.5–5.3)
PROT SERPL-MCNC: 7 G/DL (ref 6.1–8.1)
RBC # BLD AUTO: 4.37 MILLION/UL (ref 3.8–5.1)
SODIUM SERPL-SCNC: 138 MMOL/L (ref 135–146)
WBC # BLD AUTO: 5.1 THOUSAND/UL (ref 3.8–10.8)

## 2025-06-20 NOTE — RESULT ENCOUNTER NOTE
Urinalysis does show positivity to red blood cells, leukocyte esterase, and few bacteria    Complete blood cell count within normal limits and we will wait for the CMP    Otherwise, continue the ciprofloxacin as stated before and it may be an idea just to make a follow-up appointment with urology just in case if she continues to have symptoms

## 2025-06-21 LAB
APPEARANCE UR: ABNORMAL
BACTERIA #/AREA URNS HPF: ABNORMAL /HPF
BACTERIA UR CULT: NORMAL
BILIRUB UR QL STRIP: NEGATIVE
COLOR UR: ABNORMAL
GLUCOSE UR QL STRIP: NEGATIVE
HGB UR QL STRIP: ABNORMAL
HYALINE CASTS #/AREA URNS LPF: ABNORMAL /LPF
KETONES UR QL STRIP: NEGATIVE
LEUKOCYTE ESTERASE UR QL STRIP: ABNORMAL
NITRITE UR QL STRIP: NEGATIVE
PH UR STRIP: 5.5 [PH] (ref 5–8)
PROT UR QL STRIP: NEGATIVE
RBC #/AREA URNS HPF: ABNORMAL /HPF
SERVICE CMNT-IMP: ABNORMAL
SP GR UR STRIP: 1.02 (ref 1–1.03)
SQUAMOUS #/AREA URNS HPF: ABNORMAL /HPF
WBC #/AREA URNS HPF: ABNORMAL /HPF

## 2025-06-22 NOTE — RESULT ENCOUNTER NOTE
Urine culture from the second order shows no growth but I would still recommend doing the ciprofloxacin since her other urine culture did show positivity to a bacterial growth    Please let me know on how the patient is feeling and we will follow-up closely

## 2025-07-01 ENCOUNTER — APPOINTMENT (OUTPATIENT)
Dept: UROLOGY | Facility: CLINIC | Age: 40
End: 2025-07-01
Payer: COMMERCIAL

## 2025-07-01 DIAGNOSIS — R31.0 GROSS HEMATURIA: Primary | ICD-10-CM

## 2025-07-01 DIAGNOSIS — Z79.2 NEED FOR PROPHYLACTIC ANTIBIOTIC: ICD-10-CM

## 2025-07-01 LAB
POC APPEARANCE, URINE: CLEAR
POC BILIRUBIN, URINE: NEGATIVE
POC BLOOD, URINE: NEGATIVE
POC COLOR, URINE: YELLOW
POC GLUCOSE, URINE: NEGATIVE MG/DL
POC KETONES, URINE: NEGATIVE MG/DL
POC LEUKOCYTES, URINE: NEGATIVE
POC NITRITE,URINE: NEGATIVE
POC PH, URINE: 7.5 PH
POC PROTEIN, URINE: NEGATIVE MG/DL
POC SPECIFIC GRAVITY, URINE: 1.01
POC UROBILINOGEN, URINE: 0.2 EU/DL

## 2025-07-01 PROCEDURE — 88112 CYTOPATH CELL ENHANCE TECH: CPT | Performed by: PATHOLOGY

## 2025-07-01 PROCEDURE — 81003 URINALYSIS AUTO W/O SCOPE: CPT | Performed by: UROLOGY

## 2025-07-01 PROCEDURE — 99204 OFFICE O/P NEW MOD 45 MIN: CPT | Performed by: UROLOGY

## 2025-07-01 PROCEDURE — 88112 CYTOPATH CELL ENHANCE TECH: CPT

## 2025-07-01 PROCEDURE — 1036F TOBACCO NON-USER: CPT | Performed by: UROLOGY

## 2025-07-01 RX ORDER — NITROFURANTOIN 25; 75 MG/1; MG/1
100 CAPSULE ORAL ONCE
Qty: 1 CAPSULE | Refills: 0 | Status: SHIPPED | OUTPATIENT
Start: 2025-07-01 | End: 2025-07-01

## 2025-07-01 NOTE — PATIENT INSTRUCTIONS
Radiology [phone]  Take antibiotic 1 hour prior to cystoscopy.  We will need a urine sample during that appointment, so please arrive with a full enough bladder to leave a sample.  There are no restrictions in medications, eating/drinking, and driving.

## 2025-07-01 NOTE — PROGRESS NOTES
Subjective   Harper Roberts is a 39 y.o. female presenting as a new patient today, kindly referred by Dr. Leblanc due to gross hematuria. Patient reports she had three episodes of gross hematuria which have now resolved. She also notes some flank pain that has now resolved. Patient has strong family history of nephrolithiasis. She has no history of smoking. Denies fevers, chills, urinary retention, nausea or vomiting.        Medical History[1]  Surgical History[2]  Family History[3]  Current Medications[4]  Allergies[5]  Social History     Socioeconomic History    Marital status:      Spouse name: Not on file    Number of children: Not on file    Years of education: Not on file    Highest education level: Not on file   Occupational History    Not on file   Tobacco Use    Smoking status: Never    Smokeless tobacco: Never   Vaping Use    Vaping status: Never Used   Substance and Sexual Activity    Alcohol use: Yes     Comment: RARE    Drug use: Never    Sexual activity: Yes     Partners: Male     Birth control/protection: Male Sterilization   Other Topics Concern    Not on file   Social History Narrative    Not on file     Social Drivers of Health     Financial Resource Strain: Not on file   Food Insecurity: Not on file   Transportation Needs: Not on file   Physical Activity: Not on file   Stress: Not on file   Social Connections: Not on file   Intimate Partner Violence: Unknown (8/22/2023)    Received from [facility] and [facility]    Intimate Partner Violence     Fear of Current or Ex-Partner: Not on file     Emotionally Abused: Not on file     Physically Abused: Not on file     Sexually Abused: Not on file     Feel Safe at Home: Not on file   Housing Stability: Not on file       Review of Systems  Pertinent items are noted in HPI.    Objective       Lab Review  Lab Results   Component Value Date    WBC 5.1 06/19/2025    RBC 4.37 06/19/2025    HGB 13.9 06/19/2025    HCT 41.6 06/19/2025      06/19/2025      Lab Results   Component Value Date    BUN 18 06/19/2025    CREATININE 0.75 06/19/2025        Urine analysis shows negative     Assessment/Plan   Diagnoses and all orders for this visit:  Gross hematuria  -     Referral to Urology  -     POCT UA Automated manually resulted  Dysuria  -     Referral to Urology  Urinary tract infection with hematuria, site unspecified  -     Referral to Urology  Gross hematuria     We will plan for diagnostic hematuria workup which includes cystoscopy, urine cytology, and CT urogram. Patient is aware of the risks associated with intravenous contrast. There is no history of renal dysfunction. The risks of cystoscopy were discussed with the patient in great detail, including risk of hematuria, UTI and discomfort. Antibiotic will be prescribed to be taken 1 hour prior to the procedure. Patient agrees to proceed. Will schedule in a timely manner.          All questions were answered to the patient's satisfaction. Patient agrees with the plan and wishes to proceed. Follow-up will be scheduled appropriately.       Scribed for Dr. Metzger by Quin Bryant. I , Dr Metzger, have personally reviewed and agreed with the information entered by the Virtual Scribe.          [1]   Past Medical History:  Diagnosis Date    11 weeks gestation of pregnancy ([facility]) 02/18/2022    11 weeks gestation of pregnancy    15 weeks gestation of pregnancy ([facility]) 04/04/2022    15 weeks gestation of pregnancy    20 weeks gestation of pregnancy ([facility]) 05/05/2022    20 weeks gestation of pregnancy    23 weeks gestation of pregnancy ([facility])     23 weeks gestation of pregnancy    26 weeks gestation of pregnancy ([facility]) 05/16/2022    26 weeks gestation of pregnancy    27 weeks gestation of pregnancy ([facility]) 06/10/2022    27 weeks gestation of pregnancy    28 weeks gestation of pregnancy ([facility]) 06/20/2022    28 weeks gestation of pregnancy    31 weeks gestation of pregnancy ([facility])  2022    31 weeks gestation of pregnancy    33 weeks gestation of pregnancy ([facility]) 2022    33 weeks gestation of pregnancy    36 weeks gestation of pregnancy ([facility]) 2022    36 weeks gestation of pregnancy    37 weeks gestation of pregnancy ([facility]) 2022    37 weeks gestation of pregnancy    38 weeks gestation of pregnancy ([facility]) 2024    Abnormal findings on diagnostic imaging of other specified body structures 2021    Thickened endometrium    Abnormal uterine bleeding 2024    Acquired trigger finger of left middle finger 2024    Acute posthemorrhagic anemia 2022    Acute vaginitis 2021    Bacterial vaginosis    Acute vaginitis 2022    Acute vaginitis    Breast pain 2024    Breech presentation with  problem 2024    Congestion of paranasal sinus 2024    Dizziness 2024    Dry eyes, bilateral 2017    Encounter for follow-up examination after completed treatment for conditions other than malignant neoplasm 2021    Postop check    Encounter for gynecological examination (general) (routine) without abnormal findings 02/10/2021    Women's annual routine gynecological examination    Encounter for nonprocreative genetic counseling and testing 2024    Encounter for screening for human papillomavirus (HPV) 02/10/2021    Screening for HPV (human papillomavirus)    Encounter for screening for infections with a predominantly sexual mode of transmission 04/15/2021    Screening for STD (sexually transmitted disease)    Encounter for screening for malignant neoplasm of cervix 02/10/2021    Screening for cervical cancer    Encounter for supervision of other normal pregnancy, first trimester 2022    Encounter for supervision of normal pregnancy in multigravida in first trimester    Encounter for supervision of other normal pregnancy, first trimester 2021    Multigravida in first trimester     Encounter for supervision of other normal pregnancy, second trimester 2022    Encounter for supervision of normal pregnancy in multigravida in second trimester    Encounter for supervision of other normal pregnancy, third trimester 2022    Encounter for supervision of normal pregnancy in multigravida in third trimester    Gestational hypertension, third trimester ([facility]) 2024    Hypertrophy of uterus 2021    Enlarged uterus    Inconclusive mammogram 10/27/2020    Dense breast tissue    Irritable bowel syndrome without diarrhea 2022    Low lying placenta nos or without hemorrhage, unspecified trimester ([facility]) 2022    Low-lying placenta without hemorrhage    Maternal care for unspecified type scar from previous  delivery ([facility]) 04/15/2021    Uterine scar from previous  delivery complicating pregnancy    Other complications following an ectopic and molar pregnancy 2021    Partial molar pregnancy    Other conditions influencing health status 2021    Patient requests second opinion    Other conditions influencing health status 01/10/2022    History of pregnancy    Other conditions influencing health status     History of pregnancy    Other specified pregnancy related conditions, unspecified trimester ([facility]) 2022    Cramping affecting pregnancy, antepartum    Other specified symptoms and signs involving the digestive system and abdomen 2021    Abdominal fullness    Pain of foot 2024    Pelvic and perineal pain 2021    Pelvic pain in female    Personal history of malignant neoplasm of breast 2019    History of breast cancer in female    Personal history of other benign neoplasm 10/27/2020    History of other benign neoplasm    Personal history of other complications of pregnancy, childbirth and the puerperium 04/15/2021    History of pregnancy induced hypertension    Personal history of other diseases of the female  genital tract 05/10/2021    History of vaginal discharge    Personal history of other diseases of the female genital tract 2021    History of abnormal uterine bleeding    Personal history of other infectious and parasitic diseases 2021    History of candidiasis of vagina    Postoperative vaginal bleeding following genitourinary procedure 2024    Pregnancy with inconclusive fetal viability, not applicable or unspecified 04/15/2021    Encounter to determine fetal viability of pregnancy    Pregnancy with inconclusive fetal viability, not applicable or unspecified 2021    Pregnancy with inconclusive fetal viability    Pregnancy-induced hypertension, delivered ([facility]) 2024    Secondary amenorrhea 01/10/2022    Secondary amenorrhea    Severe pre-eclampsia, complicating the puerperium ([facility]) 2022    Spotting complicating pregnancy, third trimester ([facility]) 06/10/2022    Spotting affecting pregnancy in third trimester    Vomiting of pregnancy, unspecified 04/15/2021    Nausea and vomiting in pregnancy   [2]   Past Surgical History:  Procedure Laterality Date    OTHER SURGICAL HISTORY  2019    Hernia repair    OTHER SURGICAL HISTORY  2019     section    OTHER SURGICAL HISTORY  10/03/2022    Breast biopsy core needle    OTHER SURGICAL HISTORY  2021    Dilation and curettage   [3] No family history on file.  [4]   Current Outpatient Medications   Medication Sig Dispense Refill    albuterol 90 mcg/actuation inhaler Inhale 2 puffs every 6 hours if needed for wheezing. 18 g 2    inhalational spacing device (BreatheRite MDI Spacer) inhaler 1 each see administration instructions. Use with albuterol inhaler as needed. 1 each 0    levonorgestreL (Liletta) 20.4 mcg/24 hrs (8 yrs) 52 mg intrauterine device by intrauterine route 1 time.      rimegepant (Nurtec ODT) 75 mg tablet,disintegrating Dissolve 1 tablet (75 mg) in the mouth if needed (as need for migraines). 16  tablet 3     No current facility-administered medications for this visit.   [5]   Allergies  Allergen Reactions    Cefaclor Rash    Doxycycline Rash

## 2025-07-03 DIAGNOSIS — B37.49 YEAST UTI: ICD-10-CM

## 2025-07-03 LAB — BACTERIA UR CULT: ABNORMAL

## 2025-07-03 RX ORDER — FLUCONAZOLE 150 MG/1
150 TABLET ORAL DAILY
Qty: 3 TABLET | Refills: 0 | Status: SHIPPED | OUTPATIENT
Start: 2025-07-03 | End: 2025-07-06

## 2025-07-07 LAB
LABORATORY COMMENT REPORT: NORMAL
LABORATORY COMMENT REPORT: NORMAL
PATH REPORT.FINAL DX SPEC: NORMAL
PATH REPORT.GROSS SPEC: NORMAL
PATH REPORT.RELEVANT HX SPEC: NORMAL
PATH REPORT.TOTAL CANCER: NORMAL
RESIDENT REVIEW: NORMAL

## 2025-07-08 PROCEDURE — 82607 VITAMIN B-12: CPT

## 2025-07-08 PROCEDURE — 85025 COMPLETE CBC W/AUTO DIFF WBC: CPT

## 2025-07-09 ENCOUNTER — LAB (OUTPATIENT)
Dept: LAB | Facility: HOSPITAL | Age: 40
End: 2025-07-09
Payer: COMMERCIAL

## 2025-07-09 DIAGNOSIS — E61.1 IRON DEFICIENCY: ICD-10-CM

## 2025-07-09 DIAGNOSIS — D50.8 OTHER IRON DEFICIENCY ANEMIAS: Primary | ICD-10-CM

## 2025-07-09 DIAGNOSIS — E16.2 HYPOGLYCEMIA: ICD-10-CM

## 2025-07-09 DIAGNOSIS — D72.9 ABNORMAL WHITE BLOOD CELL COUNT: ICD-10-CM

## 2025-07-09 DIAGNOSIS — D50.8 IRON DEFICIENCY ANEMIA SECONDARY TO INADEQUATE DIETARY IRON INTAKE: ICD-10-CM

## 2025-07-09 DIAGNOSIS — R06.2 WHEEZING: ICD-10-CM

## 2025-07-09 DIAGNOSIS — R06.02 SOB (SHORTNESS OF BREATH): ICD-10-CM

## 2025-07-09 LAB
ALBUMIN SERPL BCP-MCNC: 4.9 G/DL (ref 3.4–5)
ALP SERPL-CCNC: 46 U/L (ref 33–110)
ALT SERPL W P-5'-P-CCNC: 11 U/L (ref 7–45)
ANION GAP SERPL CALC-SCNC: 12 MMOL/L (ref 10–20)
AST SERPL W P-5'-P-CCNC: 14 U/L (ref 9–39)
BASOPHILS # BLD AUTO: 0.04 X10*3/UL (ref 0–0.1)
BASOPHILS NFR BLD AUTO: 0.8 %
BILIRUB SERPL-MCNC: 0.6 MG/DL (ref 0–1.2)
BUN SERPL-MCNC: 17 MG/DL (ref 6–23)
CALCIUM SERPL-MCNC: 10.3 MG/DL (ref 8.6–10.6)
CHLORIDE SERPL-SCNC: 103 MMOL/L (ref 98–107)
CO2 SERPL-SCNC: 28 MMOL/L (ref 21–32)
CREAT SERPL-MCNC: 0.74 MG/DL (ref 0.5–1.05)
EGFRCR SERPLBLD CKD-EPI 2021: >90 ML/MIN/1.73M*2
EOSINOPHIL # BLD AUTO: 0.05 X10*3/UL (ref 0–0.7)
EOSINOPHIL NFR BLD AUTO: 1 %
ERYTHROCYTE [DISTWIDTH] IN BLOOD BY AUTOMATED COUNT: 12.3 % (ref 11.5–14.5)
FERRITIN SERPL-MCNC: 97 NG/ML (ref 8–150)
GLUCOSE SERPL-MCNC: 80 MG/DL (ref 74–99)
HCT VFR BLD AUTO: 44.2 % (ref 36–46)
HGB BLD-MCNC: 14.2 G/DL (ref 12–16)
HOLD SPECIMEN: NORMAL
HOLD SPECIMEN: NORMAL
IMM GRANULOCYTES # BLD AUTO: 0.02 X10*3/UL (ref 0–0.7)
IMM GRANULOCYTES NFR BLD AUTO: 0.4 % (ref 0–0.9)
IRON SATN MFR SERPL: 33 % (ref 25–45)
IRON SERPL-MCNC: 103 UG/DL (ref 35–150)
LYMPHOCYTES # BLD AUTO: 1.41 X10*3/UL (ref 1.2–4.8)
LYMPHOCYTES NFR BLD AUTO: 27 %
MCH RBC QN AUTO: 31.1 PG (ref 26–34)
MCHC RBC AUTO-ENTMCNC: 32.1 G/DL (ref 32–36)
MCV RBC AUTO: 97 FL (ref 80–100)
MONOCYTES # BLD AUTO: 0.48 X10*3/UL (ref 0.1–1)
MONOCYTES NFR BLD AUTO: 9.2 %
NEUTROPHILS # BLD AUTO: 3.22 X10*3/UL (ref 1.2–7.7)
NEUTROPHILS NFR BLD AUTO: 61.6 %
NRBC BLD-RTO: 0 /100 WBCS (ref 0–0)
PLATELET # BLD AUTO: 165 X10*3/UL (ref 150–450)
POTASSIUM SERPL-SCNC: 3.7 MMOL/L (ref 3.5–5.3)
PROT SERPL-MCNC: 7.3 G/DL (ref 6.4–8.2)
RBC # BLD AUTO: 4.57 X10*6/UL (ref 4–5.2)
SODIUM SERPL-SCNC: 139 MMOL/L (ref 136–145)
TIBC SERPL-MCNC: 311 UG/DL (ref 240–445)
UIBC SERPL-MCNC: 208 UG/DL (ref 110–370)
VIT B12 SERPL-MCNC: 495 PG/ML (ref 211–911)
WBC # BLD AUTO: 5.2 X10*3/UL (ref 4.4–11.3)

## 2025-07-09 PROCEDURE — 83540 ASSAY OF IRON: CPT

## 2025-07-09 PROCEDURE — 82728 ASSAY OF FERRITIN: CPT

## 2025-07-09 PROCEDURE — 36415 COLL VENOUS BLD VENIPUNCTURE: CPT

## 2025-07-09 PROCEDURE — 80053 COMPREHEN METABOLIC PANEL: CPT

## 2025-07-09 PROCEDURE — 83550 IRON BINDING TEST: CPT

## 2025-07-11 ENCOUNTER — TELEMEDICINE (OUTPATIENT)
Dept: HEMATOLOGY/ONCOLOGY | Facility: CLINIC | Age: 40
End: 2025-07-11
Payer: COMMERCIAL

## 2025-07-11 DIAGNOSIS — D50.8 IRON DEFICIENCY ANEMIA SECONDARY TO INADEQUATE DIETARY IRON INTAKE: ICD-10-CM

## 2025-07-11 DIAGNOSIS — D72.9 ABNORMAL WHITE BLOOD CELL COUNT: ICD-10-CM

## 2025-07-11 DIAGNOSIS — E53.8 LOW SERUM VITAMIN B12: Primary | ICD-10-CM

## 2025-07-11 DIAGNOSIS — E61.1 IRON DEFICIENCY: ICD-10-CM

## 2025-07-11 DIAGNOSIS — R31.9 HEMATURIA, UNSPECIFIED TYPE: ICD-10-CM

## 2025-07-11 PROCEDURE — 1036F TOBACCO NON-USER: CPT

## 2025-07-11 PROCEDURE — 99214 OFFICE O/P EST MOD 30 MIN: CPT

## 2025-07-11 NOTE — PROGRESS NOTES
"[facility]    PATIENT VISIT INFORMATION    Visit Type: Follow up     I performed this visit using real-time telehealth tools, including an audio/video connection between (Cassie Roberts at her home) and (Henna Greco, JESUS at [facility])  Patient consents to telemedicine service today and understands the limitations of the visit, no physical examination and all issues may not be able to be addressed today, other than brief neuro and psych assessment.     Referring Provider: Gordo Leblanc DO  Reason for referral: Abnormal white count   Primary Practice Provider: Gordo Leblanc DO    HEMATOLOGY HISTORY    39-year-old female presents for evaluation of abnormal white cell count.  Referred by her primary care physician.    Leukopenia noted from 2 years ago, but she recall it transiently over the last 20 years, starting in college. Anemic during pregnancy.  Per medical record available the patient's WBCs ranged from 3.1-4.0.  No other notable abnormalities on CBC with differential.    Patient states diagnosis of Sjogren's disease by rheumatology in [place], potentially lupus, POTS, and in [place] 8 to 10 years ago Lyme disease.     Iron deficiency, last iron infusion 11/4/2024, Venofer 3/3.    CT chest without IV contrast 1/14/2025 shows resolution of previously diagnosed pneumonia/infiltrates, small pulmonary nodule persists as described, 6 mm solid and single. Repeat 12 months if symptoms persist.     HISTORY OF PRESENT ILLNESS     ID Statement: Harper Roberts \"Cassie\" is a 39-year-old female    Chief Complaint: \"kidney issues with blood in her urine\"    Interval History:   She presents for follow up.      Nausea from antb and hematuria. No urinary symptoms. Followed urology. CT planned. Eating ok.     She had pneumonia in January and still feels wheezing daily with shortness of breath.  She states she had a PFT a few years ago and was diagnosed with asthma at that " "time.  She is out of the albuterol.      Hx of multiple sinus infections and kidney infections in the last 6 month.  She reports namely respiratory infections, total of 7 infections in last 6 months.   Follows specialist. Fatigue and loss of energy she feels in baseline.   She reports Appetite good, no weight loss recently. Lost weight and low appetite right after birth of her daughter 2 years ago. Concerned about hypoglycemia.  She wore a monitor for a couple and she would drop to the 30s in the middle of the night return to 60-70 fasting glucose in the morning.  Will follow PCP.     She notes a flare up a couple months. Chest pain for years, shooting pain across her rib cage over the last 10 years, SOB daily exertional and at rest occasionally, joint pain all over (feet, hands, neck, lower back, knees locking up), trigger finger and elbow pain, received cortisone injection Fall 2023.     \"Migraines most of my life with aura and blurry vision.\" About 15 migraines a month. Sometimes ocular migraines. She has not seen Neuro.    Notable rashes off and on, started 10 years ago. Pityriasis rosacea diagnosed.  Locations include chest, face, arm. No itch. Gets worse with a hot shower.  Always fatigue, tiered, loss of energy. Not able to nap due to work and children.  Reports a flush feeling. Temp 99F at times. Hot and cold fluctuations. Cold hands and feet all the time. Floaters with eyes. Worse with flare. Followed optometrist regularly and ophthalmologist 6 years ago. Optic nerve drusen diagnosed at age 12 years old.     IBS symptoms. EGD and colonoscopy 7 years ago blood in stool and in urine noted at that time. Dull pain in back left side and sharp pain under left rib with deep breathes over the last 10 years. Left ache on lower left abdomen. Swollen hands in morning, unable to wear wedding band. Better in afternoon.    Heavy periods 10 days on and 10 days off and spotting in between. Bleeding light every day since " "IUD placed two months ago. Biopsy performed. Lesions benign. Cycles improved.     Tinnitus 6 months ago. Numbness and tingling hands and feet sometimes face for 10 years off and on. Left breast biopsy 10 years ago diagnosed as fibroadenoma.     Hx of drenching night sweats before and after birth of daughter. Not any in the last 6 months. Oral sores 5-10 location and description as \"lumps on inner cheek and roof of mouth swollen.\" Sore throats daily, seasonal allergie, no other symptoms when they occur.   Recent sinus infection resolved 2 weeks ago.     Diagnosed with Lyme's Disease. Minocycline a couple months at a time. Follows a specialist out of [place].     Denies any lymphadenopathy or consistent neuropathy.  Sometimes urgent loose stool (IBS), Nausea ongoing as baseline, low blood sugar which helps with nausea. No constipation, urinating ok. No blood in urine now.     History of US of kidney and inflammation noted.     PAST/CURRENT HISTORY     MEDICAL/SURGICAL HISTORY  -Leukopenia  -ELBERT  -Molar miscarriage for 10-11 weeks   -Preeclampsia both pregnancies   -Emergency  with one pregnancy  -Second pregnancy placenta previa    -Sjogrens   -POTS  -Lupus?   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   -Asthma 10 years ago on inhalers   -Covid 2024 mild  -possible kidney stone   -Hypoglycemia   -Migraine headaches with aura  -Anemia in pregnancy  -Candidiasis of vagina  -Hypertension  -Lyme's disease    SOCIAL HISTORY  -Lives with  and two kids (2 years and 9 years old)  -Work place:      -Tobacco/smokeless use: denies   -Alcohol: on occasion a couple drinks a month   -Illicit drug or marijuana use: denies   -Orthodox or Spiritual beliefs: Humanistic   -Social Determinates of Health Concerns: none reported     FAMILY HISTORY  -Mom living 68 years old Rosacea, breast cancer stage 2 HER+ diagnosis at 58 years, lumpectomy and radiation, kidney stones   -Maternal " cousin brain tumor  at age 35 years old (many health issues)  -Maternal female cousin autoimmune disease unknown types  -Maternal Uncle end stages of colon stages 74 years old, hx autoimmune and EBV   -Maternal cousin female other autoimmune undiagnosed   -Paternal Uncle 50 years old diagnosed with stage 4 throat cancer non-smoker, living   -Paternal Uncle aortic aneurysm, covid prior  70 years old  -Paternal Uncle  MI (some life style risk factors)   -Maternal cousin NEWLY DIAGNOSED with breast cancer 37 years old  -No other known history of hematologic, bleeding, clotting, autoimmune, genetic, or malignant disorders in the family.     OCCUPATIONAL/ENVIRONMENTAL HISTORY/EXPOSURES:  -None reported    Active Problems, Allergy List, Medication List, and PMH/PSH/FH/Social Hx have been reviewed and reconciled in chart. Updates made when necessary.     REVIEW OF SYSTEMS   A review of systems has been completed and are negative for complaints except what is stated in the assessment, HPI, IH, ROS, and/or past medical history.    ALLERGIES AND MEDICATIONS     Allergies and Intolerances:   Allergies   Allergen Reactions    Cefaclor Rash    Doxycycline Rash      Medication Profile:   Current Outpatient Medications   Medication Instructions    albuterol 90 mcg/actuation inhaler 2 puffs, inhalation, Every 6 hours PRN    inhalational spacing device (BreatheRite MDI Spacer) inhaler 1 each, miscellaneous, See admin instructions, Use with albuterol inhaler as needed.    levonorgestreL (Liletta) 20.4 mcg/24 hrs (8 yrs) 52 mg intrauterine device Once    rimegepant (NURTEC ODT) 75 mg, oral, As needed      Available Vaccination Record:   Immunization History   Administered Date(s) Administered    COVID-19, mRNA, LNP-S, PF, 30 mcg/0.3 mL dose 04/10/2021, 2021, 2022    Flu vaccine (IIV4), preservative free *Check age/dose* 2022, 10/12/2023    Flu vaccine, trivalent, preservative free, age 6  "months and greater (Fluarix/Fluzone/Flulaval) 12/27/2024    Hepatitis B vaccine, adult *Check Product/Dose* 01/25/2013, 02/22/2013, 08/02/2013    Influenza, Unspecified 10/01/2014    Tdap vaccine, age 7 year and older (BOOSTRIX, ADACEL) 06/12/2015, 07/11/2022      PHYSICAL EXAM     Vital Signs/Measurements:       10/29/2024    11:35 AM 10/29/2024     1:21 PM 12/1/2024    12:04 PM 12/27/2024    10:19 AM 2/10/2025     1:35 PM 6/5/2025    11:34 AM 6/19/2025    10:15 AM   Vitals   Systolic 138 137 125 116 118 122 120   Diastolic 88 87 71 60 74 78 74   BP Location Right arm   Left arm Left arm Left arm Left arm   Heart Rate 78  83 85 103 88 83   Temp 36.4 °C (97.5 °F)  36.9 °C (98.4 °F)       Resp 16         Height     1.588 m (5' 2.5\")     Weight (lb) 120.15   120.8 121     BMI 21.61 kg/m2   21.73 kg/m2 21.78 kg/m2     BSA (m2) 1.55 m2   1.55 m2 1.56 m2     Visit Report   Report Report Report Report Report        Performance:   ECOG Performance Status: 0     Grade ECOG performance status   0 Fully active, able to carry on all pre-disease performance without restriction   1 Restricted in physically strenuous activity but ambulatory and able to carry out work of a light or sedentary nature, e.g., light housework, office work   2 Ambulatory and capable of all selfcare but unable to carry out any work activities; Up and about more than 50% of waking hours   3 Capable of only limited selfcare, confined to bed or chair more than 50% of waking hours   4 Completely disabled; Cannot carry out any selfcare; Totally confined to bed or chair   5 Dead     Physical Exam:  General: Patient is awake/alert/oriented x3, no distress, ill-appearing, pallor   Psychological: Intact recent and remote memory, judgement, and insight. Appropriate mood, affect, and behavior     RESULTS/DATA     Labs:   Lab Results   Component Value Date    WBC 5.2 07/08/2025    NEUTROABS 3.22 07/08/2025    IGABSOL 0.02 07/08/2025    LYMPHSABS 1.41 07/08/2025    " "MONOSABS 0.48 07/08/2025    EOSABS 0.05 07/08/2025    BASOSABS 0.04 07/08/2025    RBC 4.57 07/08/2025    MCV 97 07/08/2025    MCHC 32.1 07/08/2025    HGB 14.2 07/08/2025    HCT 44.2 07/08/2025     07/08/2025     Lab Results   Component Value Date    RETICCTPCT 1.0 06/13/2024      Lab Results   Component Value Date    CREATININE 0.74 07/09/2025    BUN 17 07/09/2025    EGFR >90 07/09/2025     07/09/2025    K 3.7 07/09/2025     07/09/2025    CO2 28 07/09/2025      Lab Results   Component Value Date    ALT 11 07/09/2025    AST 14 07/09/2025    ALKPHOS 46 07/09/2025    BILITOT 0.6 07/09/2025      Lab Results   Component Value Date    TSH 1.62 02/08/2025     Lab Results   Component Value Date    TSH 1.62 02/08/2025     Lab Results   Component Value Date    IRON 103 07/09/2025    TIBC 311 07/09/2025    FERRITIN 97 07/09/2025      Lab Results   Component Value Date    KJKYVLLU28 495 07/08/2025        Lab Results   Component Value Date    LANE Negative 06/13/2024    RF <10 09/24/2024    SEDRATE 17 06/13/2024      Lab Results   Component Value Date    CRP <0.10 06/13/2024      No results found for: \"COURTNEY\"  Lab Results   Component Value Date     08/26/2022        Radiology/Studies:   US abdomen complete 6/20/2024  IMPRESSION:  Unremarkable abdominal ultrasound  Incidental bilateral ovarian cysts or follicles as described above.     ASSESSMENT/PLAN     Assessment and Plan:   #1. Leukopenia   #2. Iron deficiency Anemia   39-year-old female presents for evaluation of abnormal white cell count.  Referred by her primary care physician.    Leukopenia noted from 2 years ago, but she recall it transiently over the last 20 years, starting in college. Anemic during pregnancy.  Per medical record available the patient's WBCs ranged from 3.1-4.0.  No other notable abnormalities on CBC with differential.    Patient states diagnosis of Sjogren's disease by rheumatology in [place], potentially lupus, POTS, and in " [place] 8 to 10 years ago Lyme disease.     Discussed some of the potential causes of leukopenia including nutritional deficiency, autoimmune, inflammatory or illness, bone marrow dysplasia or genetics.     Recent EBV infections. PCR negative. Mono sometime in recent past.  LANE and inflammatory markers negative.  Hepatitis B, C and HIV negative. US abdomen unremarkable.   Notably borderline B12 and iron.  Tsat 25% and ferritin of 23.  She will start a B12 supplement take daily to every other day.  B12 1000 mg.  She was started on low-dose iron with vitamin C. We will reassess in 3 months. Rheum consult may not be necessary at this time.     10/9/2024 ELBERT noted and lowe B12 levels.  She will take B12 daily she will purchase this over-the-counter.  IV iron ordered.  Hypoglycemia noted on her test 12 days ago without fasting.  She will follow primary.  CBC unremarkable.  Ultrasound unremarkable.    12/11/2024: Iron deficiency with the most recent iron infusions completed November 4, 2024.  She reports that she felt some improvement though still remains fatigued.  She thinks this may be partly due to recent illnesses.  Iron stores are repleted.  Leukopenia resolved since September 2024.  Uncertain if this is related to nutritional deficiency of iron versus autoimmune etiology.  We will reassess in 3 months.    Patient questing stat imaging.  CT of chest ordered for night sweats, shortness of breath, worsening congestion, and cough.  Scheduled for 12/13/2024.  Potassium slightly low at 3.1 during last chemistry assessment a week ago.  Repeat normal levels today, potassium of 3.5.  B12 levels improved.    She consulted with genetics and has a follow-up visit for results of genetic testing regarding: Breast cancer 12/16/2024.    3/11/2025 patient will follow primary regarding wheezing and hypoglycemia concerns.  Provided refill of albuterol and asked her to follow-up with primary for PFT if wheezing persists. She  recently had Pneumonia in January.   Hematology labs look good. No iron deficiency at this time. Anemia corrected. Kidney and liver function good. We will reassess in 4 months. Continue B12 supplement.     She was recently advised by her gynecologist that she can take a break from screening mammograms.  Maternal cousin recently diagnosis with breast cancer at age 37 years old.  Advise she reach out to breast specialist she saw recently for screening management.    Follow-up visit 7/11/2025  Patient iron deficiency anemia resolved at this time.  Last iron infusion was November 4, 2024.  She is following up with urology for hematuria possibly kidney stones.  We will monitor closely due to blanche hematuria.  She will follow-up with CT urography.  **Please follow with specialties as scheduled for other comorbidities and routine health screenings.**  Problem List Items Addressed This Visit           ICD-10-CM    Iron deficiency anemia secondary to inadequate dietary iron intake D50.8    Relevant Orders    Clinic Appointment Request    Oncology Line Draw Appointment Request    CBC and Auto Differential    Iron and TIBC    Comprehensive metabolic panel    Vitamin B12    Ferritin    Iron deficiency E61.1     Other Visit Diagnoses         Codes      Low serum vitamin B12    -  Primary E53.8    Relevant Orders    Clinic Appointment Request    Oncology Line Draw Appointment Request    CBC and Auto Differential    Iron and TIBC    Comprehensive metabolic panel    Vitamin B12    Ferritin      Abnormal white blood cell count     D72.9    Relevant Orders    Clinic Appointment Request    Oncology Line Draw Appointment Request    CBC and Auto Differential    Iron and TIBC    Comprehensive metabolic panel    Vitamin B12    Ferritin      Hematuria, unspecified type     R31.9           Follow up:    RTC:  - 6 months with labs  - Labs in 3 months  Medications:  -B12 500 mg three times per week    Imaging/Testing:  -NA    Referral:  -  Urology    Other Pertinent Appointments:  - Urology 8/12/2025    Patient Discussion Summary:  Discussed plan of care in detail. Patient states understanding and in agreement. Answered all questions. They will call with any additional questions and/or concerns.    Thank you for allowing me to participate in your care.     Sincerely,  Henna Greco, APRN-CNP     Hematology/Oncology Clinical Nurse Practitioner     [facility]   [id number]  [place] [id number]  Office #: [phone]    DISCLAIMER:   In preparing for this visit and writing this note, I reviewed all the previous electronic medical records (testing, labs, imaging, and other procedures, provider notes, and medical charts) of the patient available in the physician portal pertinent to patient care. Significant findings which helped in decision making are recorded in this chart.    This document may have been written by voice recognition software.  There may be some incorrect wording, spelling and/or spelling errors or punctuation errors that were not corrected prior to committing the note to the medical record. Please request clarification if there is documentation error or clarification is needed.   Time based billing: Please see documentation within this specific encounter.

## 2025-07-15 ENCOUNTER — HOSPITAL ENCOUNTER (OUTPATIENT)
Dept: RADIOLOGY | Facility: CLINIC | Age: 40
Discharge: HOME | End: 2025-07-15
Payer: COMMERCIAL

## 2025-07-15 DIAGNOSIS — R31.0 GROSS HEMATURIA: ICD-10-CM

## 2025-07-15 PROCEDURE — 76377 3D RENDER W/INTRP POSTPROCES: CPT | Performed by: RADIOLOGY

## 2025-07-15 PROCEDURE — 76377 3D RENDER W/INTRP POSTPROCES: CPT

## 2025-07-15 PROCEDURE — 2550000001 HC RX 255 CONTRASTS: Performed by: UROLOGY

## 2025-07-15 PROCEDURE — 74178 CT ABD&PLV WO CNTR FLWD CNTR: CPT | Performed by: RADIOLOGY

## 2025-07-15 RX ADMIN — IOHEXOL 68 ML: 350 INJECTION, SOLUTION INTRAVENOUS at 10:16

## 2025-07-21 NOTE — PROGRESS NOTES
Virtual Consent    An interactive audio and video telecommunication system which permits real time communications between the patient (at the originating site) and provider (at the distant site) was utilized to provide this telehealth service.   Verbal consent was requested and obtained from Harper Roberts on this date, 08/04/25 for a telehealth visit and the patient's location was confirmed at the time of the visit.    Virtual visit  Request of Pulmonary Consult to evaluate Harper Roberts  for abnormal CT chest. I have independently interviewed and examined the patient and reviewed available records.    Physician HPI (8/4/2025):  A 39 -year-old female with history of chronic Lyme disease , Sjogren syndrome, Anemia, intermittent mild leukopenia, left breast fibroadenoma, asthma, migraine headaches, pityriasis rosea, migraine headaches intermittent  polyarthralgia and sicca symptoms.   Bronchitis in thanksgiving she received an antibiotics January she had pneumonia , and had a follow up   She complains of  dry cough   SOB with mild activity , going upstairs, no orthopnea , or PND  She has occasional chest pain , diagnosed with osteochondritis  Palpitation with change position    Fatigue and joint pain from lyme disease  Follows with rheumatology, dry eye , dry mouth, can not handle contact    History of butterfly rash and back many years.    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  Age2-12 exposure to second hand smoking, house with radon      Allergy:  Mould seasonal pollen, mild allergy to cats    PSH:  Cs  D and C      ROS:  Abdominal; pain: nausea Kidney stone  Hematuria       Immunization History:  Immunization History  "  Administered Date(s) Administered    COVID-19, mRNA, LNP-S, PF, 30 mcg/0.3 mL dose 04/10/2021, 05/01/2021, 01/20/2022    Flu vaccine (IIV4), preservative free *Check age/dose* 11/07/2022, 10/12/2023    Flu vaccine, trivalent, preservative free, age 6 months and greater (Fluarix/Fluzone/Flulaval) 12/27/2024    Hepatitis B vaccine, adult *Check Product/Dose* 01/25/2013, 02/22/2013, 08/02/2013    Influenza, Unspecified 10/01/2014    Tdap vaccine, age 7 year and older (BOOSTRIX, ADACEL) 06/12/2015, 07/11/2022       Family History:  Family History[1]    Social History:  Social History[2]    Current Medications:  Current Outpatient Medications   Medication Instructions    albuterol 90 mcg/actuation inhaler 2 puffs, inhalation, Every 6 hours PRN    budesonide-formoterol (Symbicort) 80-4.5 mcg/actuation inhaler 2 puffs, inhalation, 2 times daily RT, Rinse mouth with water after use to reduce aftertaste and incidence of candidiasis. Do not swallow.    inhalational spacing device (BreatheRite MDI Spacer) inhaler 1 each, miscellaneous, See admin instructions, Use with albuterol inhaler as needed.    levonorgestreL (Liletta) 20.4 mcg/24 hrs (8 yrs) 52 mg intrauterine device Once    rimegepant (NURTEC ODT) 75 mg, oral, As needed        Drug Allergies/Intolerances:  RX Allergies[3]     All other review of systems are negative and/or non-contributory.    Physical Examination:  Ht 1.575 m (5' 2\")   Wt 54.4 kg (120 lb)   BMI 21.95 kg/m²      General: no acute distress; well-nourished; work of breathing was not increased; normal vocal character  HEENT: wears eye glasses      Chest CT Scan     CT chest wo IV contrast   1/14/2025  Status: Final result     PACS Images     Show images for CT chest wo IV contrast  Signed by    Signed Time Phone Pager   Dayron Lam MD 1/14/2025 16:55 [phone]     Exam Information    Status Exam Begun Exam Ended   Final 1/14/2025 10:43 1/14/2025 11:11     Study Result    Narrative & " Impression   Interpreted By:  Dayron Lam,   STUDY:  CT CHEST WO IV CONTRAST;  1/14/2025 11:11 am      INDICATION:  Signs/Symptoms:PNA follow up.      COMPARISON:  12/13/2024      ACCESSION NUMBER(S):  [id number]      ORDERING CLINICIAN:  ANTE PLETIKOSIC      TECHNIQUE:  Helical data acquisition of the chest was obtained  without IV  contrast material.  Images were reformatted in axial, coronal, and  sagittal planes.      FINDINGS:  Previously reported nodular ground-glass opacities of the right lung  have resolved.      There is a residual nodule of the right mid lung juxtaposed to the  fissure measuring 5 mm in size most commonly a intrapulmonary lymph  node. Nonetheless continued surveillance is recommended following  Fleischer criteria.      Less than 6 mm solid single nodule in high-risk patient high risk,  optional 12 month follow-up CT scan recommended.          No additional nodules are seen. No acute infiltrate or pleural  effusion.      Soft tissue density of the anterior mediastinum without masslike  features likely residual thymus, stable since prior examination.  Heart size is normal.      Limited view of the upper abdomen shows no acute abnormality. No  acute osseous abnormality      IMPRESSION:  1.  Resolution of previously reported infiltrates. Small pulmonary  nodule persists as described above      Signed by: Dayron Lam 1/14/2025 4:55 PM  Dictation workstation:   [id number]        Assessment and Plan / Recommendations:  Problem List Items Addressed This Visit       Pulmonary nodule    Mild intermittent asthma - Primary    Relevant Medications    budesonide-formoterol (Symbicort) 80-4.5 mcg/actuation inhaler    Other Relevant Orders    CT chest wo IV contrast    Complete Pulmonary Function Test Pre/Post Bronchodilator (Spirometry Pre/Post/DLCO/Lung Volumes)    Atelectasis   Abnormal CT chest   Pulmonary Nodule  CT chest 1/2025: Less than 6 mm solid single nodule in high-risk patient  high risk,  12 month follow-up CT scan recommended.    Bronchial asthma:  History of bronchial asthma   Recurrent chest wheezes and dry cough  PFT pre and post bronchodilator ordered  Prescribed Symbicort and PRN albuterol    Basal atelectasis:  CT abdomen shows basal atelectasis:      Other medical problems:  Chronic Lyme disease  Anemia  Intermittent leukopenia  History of molar pregnancy  Sjogren syndrome and sicca disease    Professional time of this encounter 50 min    Nilsa Medel MD  08/04/2025             [1]   Family History  Problem Relation Name Age of Onset    Cancer Mother Leesa     Hypertension Mother Leesa     Cancer Mother Leesa    [2]   Social History  Socioeconomic History    Marital status:    Tobacco Use    Smoking status: Never    Smokeless tobacco: Never   Vaping Use    Vaping status: Never Used   Substance and Sexual Activity    Alcohol use: Yes     Comment: RARE    Drug use: Never    Sexual activity: Yes     Partners: Male     Birth control/protection: Male Sterilization, I.U.D.     Social Drivers of Health      Received from [facility]    Intimate Partner Violence   [3]   Allergies  Allergen Reactions    Cefaclor Rash    Doxycycline Rash

## 2025-08-04 ENCOUNTER — TELEPHONE (OUTPATIENT)
Facility: CLINIC | Age: 40
End: 2025-08-04

## 2025-08-04 ENCOUNTER — APPOINTMENT (OUTPATIENT)
Facility: CLINIC | Age: 40
End: 2025-08-04
Payer: COMMERCIAL

## 2025-08-04 VITALS — HEIGHT: 62 IN | WEIGHT: 120 LBS | BODY MASS INDEX: 22.08 KG/M2

## 2025-08-04 DIAGNOSIS — R91.1 PULMONARY NODULE: ICD-10-CM

## 2025-08-04 DIAGNOSIS — J98.11 ATELECTASIS: ICD-10-CM

## 2025-08-04 DIAGNOSIS — J45.20 MILD INTERMITTENT ASTHMA, UNSPECIFIED WHETHER COMPLICATED (HHS-HCC): Primary | ICD-10-CM

## 2025-08-04 PROCEDURE — 1036F TOBACCO NON-USER: CPT | Performed by: INTERNAL MEDICINE

## 2025-08-04 PROCEDURE — 99204 OFFICE O/P NEW MOD 45 MIN: CPT | Performed by: INTERNAL MEDICINE

## 2025-08-04 PROCEDURE — 3008F BODY MASS INDEX DOCD: CPT | Performed by: INTERNAL MEDICINE

## 2025-08-04 RX ORDER — BUDESONIDE AND FORMOTEROL FUMARATE DIHYDRATE 80; 4.5 UG/1; UG/1
2 AEROSOL RESPIRATORY (INHALATION)
Qty: 10.2 G | Refills: 11 | Status: SHIPPED | OUTPATIENT
Start: 2025-08-04 | End: 2026-08-04

## 2025-08-04 RX ORDER — ALBUTEROL SULFATE 0.83 MG/ML
3 SOLUTION RESPIRATORY (INHALATION) ONCE
OUTPATIENT
Start: 2025-08-04 | End: 2025-08-04

## 2025-08-04 RX ORDER — ALBUTEROL SULFATE 90 UG/1
1 INHALANT RESPIRATORY (INHALATION) ONCE
OUTPATIENT
Start: 2025-08-04

## 2025-08-04 ASSESSMENT — PAIN SCALES - GENERAL: PAINLEVEL_OUTOF10: 0-NO PAIN

## 2025-08-07 DIAGNOSIS — N20.0 KIDNEY STONE: ICD-10-CM

## 2025-08-07 RX ORDER — KETOROLAC TROMETHAMINE 10 MG/1
10 TABLET, FILM COATED ORAL EVERY 8 HOURS PRN
Qty: 15 TABLET | Refills: 0 | Status: SHIPPED | OUTPATIENT
Start: 2025-08-07 | End: 2025-08-12

## 2025-08-12 ENCOUNTER — APPOINTMENT (OUTPATIENT)
Dept: UROLOGY | Facility: CLINIC | Age: 40
End: 2025-08-12
Payer: COMMERCIAL

## 2025-08-22 DIAGNOSIS — G43.909 MIGRAINE, UNSPECIFIED, NOT INTRACTABLE, WITHOUT STATUS MIGRAINOSUS: ICD-10-CM

## 2025-08-22 RX ORDER — RIMEGEPANT SULFATE 75 MG/75MG
TABLET, ORALLY DISINTEGRATING ORAL
Qty: 16 TABLET | Refills: 3 | OUTPATIENT
Start: 2025-08-22

## 2025-08-27 ENCOUNTER — HOSPITAL ENCOUNTER (OUTPATIENT)
Dept: RESPIRATORY THERAPY | Facility: HOSPITAL | Age: 40
Discharge: HOME | End: 2025-08-27
Payer: COMMERCIAL

## 2025-08-27 DIAGNOSIS — J45.20 MILD INTERMITTENT ASTHMA, UNSPECIFIED WHETHER COMPLICATED (HHS-HCC): ICD-10-CM

## 2025-08-27 LAB
MGC ASCENT PFT - FEV1 - POST: 3.07
MGC ASCENT PFT - FEV1 - PRE: 2.94
MGC ASCENT PFT - FEV1 - PREDICTED: 2.74
MGC ASCENT PFT - FVC - POST: 3.6
MGC ASCENT PFT - FVC - PRE: 3.62
MGC ASCENT PFT - FVC - PREDICTED: 3.26

## 2025-08-27 PROCEDURE — 94060 EVALUATION OF WHEEZING: CPT | Performed by: INTERNAL MEDICINE

## 2025-08-27 PROCEDURE — 94726 PLETHYSMOGRAPHY LUNG VOLUMES: CPT | Performed by: INTERNAL MEDICINE

## 2025-08-27 PROCEDURE — 94729 DIFFUSING CAPACITY: CPT | Performed by: INTERNAL MEDICINE

## 2025-08-27 PROCEDURE — 2500000001 HC RX 250 WO HCPCS SELF ADMINISTERED DRUGS (ALT 637 FOR MEDICARE OP): Performed by: INTERNAL MEDICINE

## 2025-08-27 PROCEDURE — 94726 PLETHYSMOGRAPHY LUNG VOLUMES: CPT

## 2025-08-27 RX ORDER — ALBUTEROL SULFATE 90 UG/1
4 INHALANT RESPIRATORY (INHALATION) ONCE
Status: COMPLETED | OUTPATIENT
Start: 2025-08-27 | End: 2025-08-27

## 2025-08-27 RX ORDER — ALBUTEROL SULFATE 0.83 MG/ML
3 SOLUTION RESPIRATORY (INHALATION) ONCE
Status: COMPLETED | OUTPATIENT
Start: 2025-08-27 | End: 2025-08-27

## 2025-08-27 RX ADMIN — ALBUTEROL SULFATE 4 PUFF: 90 AEROSOL, METERED RESPIRATORY (INHALATION) at 15:50

## 2025-08-29 ENCOUNTER — APPOINTMENT (OUTPATIENT)
Dept: OBSTETRICS AND GYNECOLOGY | Facility: CLINIC | Age: 40
End: 2025-08-29
Payer: COMMERCIAL

## 2025-08-29 VITALS
WEIGHT: 119 LBS | DIASTOLIC BLOOD PRESSURE: 62 MMHG | BODY MASS INDEX: 21.9 KG/M2 | SYSTOLIC BLOOD PRESSURE: 102 MMHG | HEIGHT: 62 IN

## 2025-08-29 DIAGNOSIS — Z12.31 BREAST CANCER SCREENING BY MAMMOGRAM: ICD-10-CM

## 2025-08-29 DIAGNOSIS — Z01.419 WELL WOMAN EXAM WITH ROUTINE GYNECOLOGICAL EXAM: Primary | ICD-10-CM

## 2025-08-29 PROCEDURE — 1036F TOBACCO NON-USER: CPT | Performed by: OBSTETRICS & GYNECOLOGY

## 2025-08-29 PROCEDURE — 3074F SYST BP LT 130 MM HG: CPT | Performed by: OBSTETRICS & GYNECOLOGY

## 2025-08-29 PROCEDURE — 3008F BODY MASS INDEX DOCD: CPT | Performed by: OBSTETRICS & GYNECOLOGY

## 2025-08-29 PROCEDURE — 3078F DIAST BP <80 MM HG: CPT | Performed by: OBSTETRICS & GYNECOLOGY

## 2025-08-29 PROCEDURE — 99395 PREV VISIT EST AGE 18-39: CPT | Performed by: OBSTETRICS & GYNECOLOGY

## 2025-08-29 RX ORDER — BISMUTH SUBSALICYLATE 262 MG
1 TABLET,CHEWABLE ORAL DAILY
COMMUNITY

## 2025-09-04 ENCOUNTER — OFFICE VISIT (OUTPATIENT)
Dept: URGENT CARE | Age: 40
End: 2025-09-04
Payer: COMMERCIAL

## 2025-09-04 DIAGNOSIS — M54.12 CERVICAL RADICULOPATHY: Primary | ICD-10-CM

## 2025-09-04 RX ORDER — METHYLPREDNISOLONE 4 MG/1
TABLET ORAL
Qty: 21 TABLET | Refills: 0 | Status: SHIPPED | OUTPATIENT
Start: 2025-09-04 | End: 2025-09-10

## 2025-09-04 RX ORDER — METHOCARBAMOL 500 MG/1
500 TABLET, FILM COATED ORAL NIGHTLY
Qty: 7 TABLET | Refills: 0 | Status: SHIPPED | OUTPATIENT
Start: 2025-09-04 | End: 2025-09-11

## 2026-01-07 ENCOUNTER — APPOINTMENT (OUTPATIENT)
Facility: CLINIC | Age: 41
End: 2026-01-07
Payer: COMMERCIAL